# Patient Record
Sex: MALE | Race: OTHER | HISPANIC OR LATINO | Employment: STUDENT | URBAN - METROPOLITAN AREA
[De-identification: names, ages, dates, MRNs, and addresses within clinical notes are randomized per-mention and may not be internally consistent; named-entity substitution may affect disease eponyms.]

---

## 2024-09-30 ENCOUNTER — ATHLETIC TRAINING SESSION (OUTPATIENT)
Dept: SPORTS MEDICINE | Facility: CLINIC | Age: 24
End: 2024-09-30
Payer: COMMERCIAL

## 2024-09-30 DIAGNOSIS — S09.90XA INJURY OF HEAD, INITIAL ENCOUNTER: Primary | ICD-10-CM

## 2024-09-30 NOTE — PROGRESS NOTES
"Reason for Encounter New Injury    Subjective:       Chief Complaint: Derick Zee is a 23 y.o. male student at [employer] who had concerns including Head Injury. During the Scrimmage on Friday, 09/27/2024, he was hit in the back of the head by the batters back swing. Ath was alert, had no difficulties remembering what happened, felt fine, and was able to finish the game.      Sport played: baseball      Level: college      Position:catcher      Head Injury  This is a new problem. The current episode started in the past 7 days. The problem has been unchanged. Pertinent negatives include no nausea, numbness or vomiting.       Review of Systems   Eyes:  Negative for blurred vision, double vision, vision loss in left eye, vision loss in right eye, visual disturbance and visual halos.   Gastrointestinal:  Negative for nausea and vomiting.   Neurological:  Negative for dizziness, loss of balance and numbness.                 Objective:       General: Derick is well-developed, well-nourished, appears stated age, in no acute distress, alert and oriented to time, place and person.     General    Neurological: He is alert. No cranial nerve deficit. Coordination normal.   Pt was alert, able to answer questions correctly in a timely manner, able to recall events that happened prior to being hit, had no issues with eye tracking or vision             Concussion Symptoms Score Sheet     Date of Concussion: 09/27/2024        Symptoms Score Sheet for Each Day  Please rate the severity of your symptoms on a scale of 0-6.  None=0, Mild=1-2, Moderate=3-4, Severe=5-6    Date of Evaluation 9/27/2024 9/28/2024   Symptom Evaluation  0-6 0-6   Headache 3 2   "Pressure in head" 1 2   Neck pain  0 0   Nausea or vomiting 0 0   Dizziness 0 0   Blurred vision 0 0   Balance problems 0 0   Sensitivity to light 2 1   Sensitivity to noise  0 0   Feeling slowed down 0 0   Feeling like "in a fog" 0 0   "Don't feel right" 0 0   Difficulty concentrating " 2 0   Difficulty remembering  0 0   Fatigue or low energy 0 0   Confusion  0 0   Drowsiness 0 0   More emotional 0 0   Irritability  1 1   Sadness 0 0   Nervous or Anxious 0 0   Trouble falling asleep 0 2         Total # of symptoms 5/22 5/22   Symptom severity score 9/132 8/132     - Symptoms did not get worse with physical activity on 9/28/2024.        Assessment:     Status: O - Out    Date Seen:  09/27/2024- 09/28/2024    Date of Injury:  09/27/2024    Date Out:  09/30/2024    Date Cleared:  N/A        Treatment/Rehab/Maintenance:          Plan:       1. Have athlete see Dr. Mireles for a concussion evaluation since his symptoms have not improved much since the onset, 09/27/2024.  2. Physician Referral: yes  3. ED Referral:no  4. Parent/Guardian Notified: No  5. All questions were answered, ath. will contact me for questions or concerns in  the interim.  6.         Eligible to use School Insurance: Yes

## 2024-10-01 ENCOUNTER — OFFICE VISIT (OUTPATIENT)
Dept: SPORTS MEDICINE | Facility: CLINIC | Age: 24
End: 2024-10-01
Payer: COMMERCIAL

## 2024-10-01 VITALS
HEART RATE: 48 BPM | WEIGHT: 195 LBS | DIASTOLIC BLOOD PRESSURE: 77 MMHG | SYSTOLIC BLOOD PRESSURE: 127 MMHG | HEIGHT: 71 IN | BODY MASS INDEX: 27.3 KG/M2

## 2024-10-01 DIAGNOSIS — M99.00 CRANIAL SOMATIC DYSFUNCTION: ICD-10-CM

## 2024-10-01 DIAGNOSIS — M99.03 SOMATIC DYSFUNCTION OF LUMBAR REGION: ICD-10-CM

## 2024-10-01 DIAGNOSIS — M99.08 SOMATIC DYSFUNCTION OF RIB CAGE REGION: ICD-10-CM

## 2024-10-01 DIAGNOSIS — M99.01 SOMATIC DYSFUNCTION OF CERVICAL REGION: ICD-10-CM

## 2024-10-01 DIAGNOSIS — M99.02 SOMATIC DYSFUNCTION OF THORACIC REGION: ICD-10-CM

## 2024-10-01 DIAGNOSIS — S06.0X0A CONCUSSION WITHOUT LOSS OF CONSCIOUSNESS, INITIAL ENCOUNTER: Primary | ICD-10-CM

## 2024-10-01 PROCEDURE — 3078F DIAST BP <80 MM HG: CPT | Mod: CPTII,S$GLB,, | Performed by: ORTHOPAEDIC SURGERY

## 2024-10-01 PROCEDURE — 3074F SYST BP LT 130 MM HG: CPT | Mod: CPTII,S$GLB,, | Performed by: ORTHOPAEDIC SURGERY

## 2024-10-01 PROCEDURE — 1160F RVW MEDS BY RX/DR IN RCRD: CPT | Mod: CPTII,S$GLB,, | Performed by: ORTHOPAEDIC SURGERY

## 2024-10-01 PROCEDURE — 3008F BODY MASS INDEX DOCD: CPT | Mod: CPTII,S$GLB,, | Performed by: ORTHOPAEDIC SURGERY

## 2024-10-01 PROCEDURE — 99204 OFFICE O/P NEW MOD 45 MIN: CPT | Mod: 25,S$GLB,, | Performed by: ORTHOPAEDIC SURGERY

## 2024-10-01 PROCEDURE — 1159F MED LIST DOCD IN RCRD: CPT | Mod: CPTII,S$GLB,, | Performed by: ORTHOPAEDIC SURGERY

## 2024-10-01 PROCEDURE — 99999 PR PBB SHADOW E&M-EST. PATIENT-LVL III: CPT | Mod: PBBFAC,,, | Performed by: ORTHOPAEDIC SURGERY

## 2024-10-01 PROCEDURE — 98927 OSTEOPATH MANJ 5-6 REGIONS: CPT | Mod: S$GLB,,, | Performed by: ORTHOPAEDIC SURGERY

## 2024-10-01 NOTE — PROGRESS NOTES
"Subjective:     Derick, a 23 y.o. male is here today for evaluation of a closed head injury. DOI: 09/27/2024. No LOC from event. He states he was participating in a scrimmage when he was hit in the back of the head by the batter's back swing. He immediately appreciated blurry vision and states he fell to the ground. He states he completed playing the remainder of the game. He continues to appreciate pressure affecting the front of his head and behind his eyes.     School / grade: [employer] / senior   Sport: Baseball  Position: Catcher  Dominant hand: Right   How many concussions have you have in the past? 0  When was your most recent concussion & how long was recovery? NA  Have you ever been hospitalized or had medical imaging done for a head injury? No  Have you ever been diagnosed with headaches or migraines? No  Do you have a learning disability / dyslexia? No  Do you have ADD/ADHD? No  Have you been diagnosed with depression, anxiety or other psychiatric disorder? No  Do you have a history of motion sickness? No  Do you have a history of syncope? No  Do you have a history of epilepsy/seizures? No  Do you wear glasses or contacts? No    If so, when was your last vision exam? NA  Have you taken a baseline ImPACT examination? Yes      Symptom Evaluation  0-6   Headache 3   "Pressure in head" 3   Neck pain  0   Nausea or vomiting 0   Dizziness 0   Blurred vision 0   Balance problems 0   Sensitivity to light 2   Sensitivity to noise  3   Feeling slowed down 0   Feeling like "in a fog" 0   "Don't feel right" 1   Difficulty concentrating 0   Difficulty remembering  0   Fatigue or low energy 1   Confusion  0   Drowsiness 0   More emotional 0   Irritability  1   Sadness 0   Nervous or Anxious 0   Trouble falling asleep 3         Total # of symptoms 8/22   Symptom severity score 17/132     HPI template based on:  1) Consensus statement on concussion in sport--the 5th international conference on concussion in sport held in " "[place], October 2016  2) Sport concussion assessment tool--5th edition    PAST MEDICAL HISTORY:  History reviewed. No pertinent past medical history.    SURGICAL HISTORY:  History reviewed. No pertinent surgical history.    FAMILY HISTORY:  No family history on file.    SOCIAL HISTORY:   has no history on file for tobacco use, alcohol use, and drug use.    MEDICATIONS:  No current outpatient medications on file.    ALLERGIES:  Review of patient's allergies indicates:  No Known Allergies      Objective:     PHYSICAL EXAMINATION:  /77   Pulse (!) 48   Ht 5' 11" (1.803 m)   Wt 88.5 kg (195 lb)   BMI 27.20 kg/m²   Vitals signs and nursing note have been reviewed.  General: In no acute distress, well developed, well nourished, no diaphoresis  Eyes: no eye redness or discharge  HENT: normocephalic and atraumatic, neck supple, trachea midline, no nasal discharge, no external ear redness or discharge. No evidence of ronald orbital raccoon sign to suggest orbital fracture or mastoid process austin sign to suggest basilar skull fracture on observation. No significant pain with cranial compression to suggest skull fracture upon palpation.   Cardiovascular: 2+ and symmetric radial and DP pulses bilaterally, no LE edema  Lungs: respirations non-labored, no conversational dyspnea   Abd: non-distended, no rigidity  Skin: No rashes, warm and dry  Psychiatric: cooperative, pleasant, mood and affect appropriate for age    NEURO EXAM:  Sensation to light touch intact for UE and LE dermatomes  CN II-XII intact suggesting no intracranial hemorrhage  Upper limb and lower limb coordination: normal  Finger-to-nose testing: appropriate    Strength Testing: ('*' = with pain)           Upper Extremity  Deltoid                                    5/5 B/L  Biceps               5/5 B/L  Triceps               5/5 B/L  Wrist Flexion   5/5 B/L  Wrist Extension  5/5 B/L      5/5 B/L  Finger ABduction  5/5 B/L  Finger ABduction  5/5 " B/L  EPL (Ext. pollicis longus) 5/5 B/L  Pinch Mechanism  5/5 B/L    Lower Extremity  Hip Flexion   5/5 B/L  Hamstrings   5/5 B/L  Quadraceps              5/5 B/L  Ankle Dorsiflexion  5/5 B/L  Ankle Plantarflexion  5/5 B/L  Ankle Inversion  5/5 B/L  Ankle Eversion  5/5 B/L  EHL (Ext. hollicis longus) 5/5 B/L       Special Tests:                          Spurling's  Negative B/L  Seated SLR  Negative B/L    Modified Balance Error Scoring System (mBESS) testing:    Non-dominant foot: left   Testing surface: Hard floor, shoes on   Number of Errors   Double Leg Stance 0     Single Leg Stance 4     Tandem Stance 0     Total Errors 4       Vestibular/Ocular-Motor Screening (VOMS) Testing:   Headache Dizziness Nausea Fogginess Comments   Symptom severity prior to test 5   0   0   0   No data recorded   VOM Test        Smooth Pursuits 5   0   0   3   blurry vision     Saccades - Horizontal 5   0   0   3   No data recorded   Saccades - Vertical 5   3   0   0   No data recorded   Congergence 5   3   0   4   Measurements (cm):    8 cm, 9 cm, 8 cm - left lateral eye drift     VOR - Horizontal 5   0   0   1   No data recorded   VOR - Vertical 5   0   0   2   No data recorded   Visual Motion Sensitivity Test 5   3   0   4   No data recorded       MUSCULOSKELETAL EXAM:    Posture:  Upright  Neck examination:  Range of motion: Normal  Tenderness: None    TART (Tissue texture abnormality, Asymmetry,  Restriction of motion and/or Tenderness) changes:    Head:     - Cranial Rhythmic Impulse (CRI): restricted with Decreased amplitude left posterior  - Strain Patterns: Left lateral strain  Occipitoatlantal (OA) Joint: ES-right, R-left     Cervical Spine   C1 Rotated LEFT   C2 Neutral   C3 Neutral   C4 ERS LEFT   C5 ERS LEFT   C6 Neutral   C7 FRS RIGHT      Thoracic Spine   T1 FRS RIGHT   T2 FRS RIGHT   T3 Neutral   T4 Neutral   T5 NS-left,R-right   T6 NS-left,R-right   T7 NS-left,R-right   T8 Neutral   T9 Neutral   T10 Neutral   T11  Neutral   T12 Neutral     Rib cage:   Superior rib 1 on the left  Posterior rib 4-5 left     Lumbar Spine   L1 Neutral   L2 Neutral   L3 Neutral   L4 FRS LEFT   L5 FRS LEFT     Pelvis:    Sacrum:      Key   F= Flexed   E = Extended   R = Rotated   N = Neutral   S = Sidebent   TTA = tissue texture abnormality   L/R/B (last letter) = left/right/bilateral   HTP = fascial herniated trigger point   TB = fascial trigger band   CD = fascial continuum distortion       Assessment:     Encounter Diagnoses   Name Primary?    Concussion without loss of consciousness, initial encounter Yes    Somatic dysfunction of cervical region     Cranial somatic dysfunction     Somatic dysfunction of thoracic region     Somatic dysfunction of rib cage region     Somatic dysfunction of lumbar region      First time concussion, w/out loss of consciousness  No evidence of myelopathy / spinal cord pathology  No evidence of focal neurologic deficit  No evidence of skull fracture    Plan:     1) Reassuring evaluation, though symptoms remain.    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 He states he immediately feel to the ground and had blurry vision. He continues to appreciate pressure behind his eyes and denies improvement in his symptoms since his initial injury.     - Concussion information folder with handouts provided.  Information regarding return to learn, return to play, daily symptoms/severity scores, accommodations provided and discussed as necessary.       Yes (+) or No (-) Comments   Neuropsychological testing     Administered, reviewed, and shared with the patient (and family, if present) at this visit. - Deferred due to symptom exacerbation with VOMs   Mental activity     School attendance allowed? +    w/ concussion accommodations? -    Social activity     In person, telephone, and text interactions limited? +     Physical activity (e.g. sports, work)     sports participation prohibited? + Not yet cleared to begin RTP protocol   Clinic contact w/  today to discuss plan? + School: [employer]  : Preston Castillo       2) Education:   - Education provided on the diagnosis of concussion. We reviewed the signs and symptoms of concussion, current knowledge on concussions, and the importance of brain and physical rest until symptom resolution. Once symptoms are improving/improved, a progressive return to activity under daily guidance is initiated. We discussed second impact syndrome, that all concussions are significant, and that we cannot predict when one will result in residual symptoms or the development of sequelae later in life. We also reviewed that concussions also often are a whiplash event that can have mechanical head, neck, and upper back symptoms that may improve/resolve with osteopathic manipulative treatment. I advised that concussion is a clinical diagnosis and we take into account many factors including mechanism of injury, symptoms and symptom severity, and physical examination focusing on the neurologic and visual symptoms.    3) OMT 5-6 regions. Oral consent obtained by patient and parent.  Reviewed benefits and potential side effects. Parent present in the room during entire evaluation and treatment.  - OMT indicated today due to signs and symptoms as well as local and remote somatic dysfunction findings and their related neurokinetic, lymphatic, fascial and/or arteriovenous body connections.   - OMT techniques used: Soft Tissue, Myofascial Release, Muscle Energy, High Velocity Low Amplitude, and Cranial    - Treatment was tolerated well. Improvement noted in segmental mobility post-treatment in dysfunctional regions. There were no adverse events and no complications immediately following treatment.     4) Follow up in 1 week or sooner for re evaluation should patient's symptoms  COMPLETELY resolve  -  upon successful completion of RTP protocol per SCAT5, pt/family/AT will contact the clinic, and the clinic will document successful completion  - if any Step of RTP protocol per SCAT5 is failed, pt/family/AT will immediately alert the clinic for further evaluation    - Should symptoms acutely worsen, or should new symptoms arise, the patient should call the clinic, but if unavailable immediately present to the Emergency Department for further evaluation.    5) Patient and parent/family/ATC agreeable to today's plan and all questions were answered      This note is dictated using the M*Modal Fluency Direct word recognition program. There are word recognition mistakes that are occasionally missed on review.

## 2024-10-07 NOTE — PROGRESS NOTES
"Subjective:     Derick is here today for follow up evaluation of closed head injury sustained 09/27/2024. He states he is now feeling 65-70% improved but continues to note worsening pressure in his head with exertion. He denies new injury or trauma since his last visit. He states he has been working with his  and has progressed through three steps of the RTP protocol and notes worsening symptoms during and after exercise.     Recall from visit on 10/01/2024  Derick, a 23 y.o. male is here today for evaluation of a closed head injury. DOI: 09/27/2024. No LOC from event. He states he was participating in a scrimmage when he was hit in the back of the head by the batter's back swing. He immediately appreciated blurry vision and states he fell to the ground. He states he completed playing the remainder of the game. He continues to appreciate pressure affecting the front of his head and behind his eyes.     School / grade: [employer] / senior   Sport: Baseball  Position: Catcher  Dominant hand: Right   How many concussions have you have in the past? 0  When was your most recent concussion & how long was recovery? NA  Have you ever been hospitalized or had medical imaging done for a head injury? No  Have you ever been diagnosed with headaches or migraines? No  Do you have a learning disability / dyslexia? No  Do you have ADD/ADHD? No  Have you been diagnosed with depression, anxiety or other psychiatric disorder? No  Do you have a history of motion sickness? No  Do you have a history of syncope? No  Do you have a history of epilepsy/seizures? No  Do you wear glasses or contacts? No    If so, when was your last vision exam? NA  Have you taken a baseline ImPACT examination? Yes       10/1/24 10/7/24   Symptom Evaluation  0-6 0-6   Headache 3 0   "Pressure in head" 3 2   Neck pain  0 0   Nausea or vomiting 0 0   Dizziness 0 0   Blurred vision 0 0   Balance problems 0 0   Sensitivity to light 2 1   Sensitivity to " "noise  3 1   Feeling slowed down 0 0   Feeling like "in a fog" 0 0   "Don't feel right" 1 2   Difficulty concentrating 0 0   Difficulty remembering  0 0   Fatigue or low energy 1 2   Confusion  0 0   Drowsiness 0 0   More emotional 0 0   Irritability  1 1   Sadness 0 0   Nervous or Anxious 0 0   Trouble falling asleep 3 2          Total # of symptoms 8/22 7/22   Symptom severity score 17/132 11/132     HPI template based on:  1) Consensus statement on concussion in sport--the 5th international conference on concussion in sport held in [place], October 2016  2) Sport concussion assessment tool--5th edition    PAST MEDICAL HISTORY:  History reviewed. No pertinent past medical history.    SURGICAL HISTORY:  History reviewed. No pertinent surgical history.    FAMILY HISTORY:  No family history on file.    SOCIAL HISTORY:   has no history on file for tobacco use, alcohol use, and drug use.    MEDICATIONS:  No current outpatient medications on file.    ALLERGIES:  Review of patient's allergies indicates:  No Known Allergies      Objective:     PHYSICAL EXAMINATION:  /76   Pulse 62   Ht 5' 11" (1.803 m)   Wt 88.5 kg (195 lb 1.7 oz)   BMI 27.21 kg/m²   Vitals signs and nursing note have been reviewed.  General: In no acute distress, well developed, well nourished, no diaphoresis  Eyes: no eye redness or discharge  HENT: normocephalic and atraumatic, neck supple, trachea midline, no nasal discharge, no external ear redness or discharge. No evidence of ronald orbital raccoon sign to suggest orbital fracture or mastoid process austin sign to suggest basilar skull fracture on observation. No significant pain with cranial compression to suggest skull fracture upon palpation.   Cardiovascular: 2+ and symmetric radial and DP pulses bilaterally, no LE edema  Lungs: respirations non-labored, no conversational dyspnea   Abd: non-distended, no rigidity  Skin: No rashes, warm and dry  Psychiatric: cooperative, pleasant, mood " and affect appropriate for age    NEURO EXAM:  Sensation to light touch intact for UE and LE dermatomes  CN II-XII intact suggesting no intracranial hemorrhage  Upper limb and lower limb coordination: normal  Finger-to-nose testing: appropriate    Strength Testing: ('*' = with pain)           Upper Extremity  Deltoid                                    5/5 B/L  Biceps               5/5 B/L  Triceps               5/5 B/L  Wrist Flexion   5/5 B/L  Wrist Extension  5/5 B/L      5/5 B/L  Finger ABduction  5/5 B/L  Finger ABduction  5/5 B/L  EPL (Ext. pollicis longus) 5/5 B/L  Pinch Mechanism  5/5 B/L    Lower Extremity  Hip Flexion   5/5 B/L  Hamstrings   5/5 B/L  Quadraceps              5/5 B/L  Ankle Dorsiflexion  5/5 B/L  Ankle Plantarflexion  5/5 B/L  Ankle Inversion  5/5 B/L  Ankle Eversion  5/5 B/L  EHL (Ext. hollicis longus) 5/5 B/L       Special Tests:                          Spurling's  Negative B/L  Seated SLR  Negative B/L    Modified Balance Error Scoring System (mBESS) testing:    Non-dominant foot: left   Testing surface: Hard floor, shoes on   Number of Errors   Double Leg Stance 0     Single Leg Stance 0     Tandem Stance 0     Total Errors 0       Vestibular/Ocular-Motor Screening (VOMS) Testing:   Headache Dizziness Nausea Fogginess Comments   Symptom severity prior to test 0   2   0   0   No data recorded   VOM Test        Smooth Pursuits 0   2   0   0   eye pressure     Saccades - Horizontal 1   1   0   3   No data recorded   Saccades - Vertical 1   1   0   3   No data recorded   Congergence 2   1   0   2   Measurements (cm):    >14 cm, > 14 cm, > 14 cm     VOR - Horizontal 1   1   0   3   No data recorded   VOR - Vertical 2   1   0   3   No data recorded   Visual Motion Sensitivity Test 2   1   0   3   No data recorded     MUSCULOSKELETAL EXAM:    Posture:  Upright  Neck examination:  Range of motion: Normal  Tenderness: None    TART (Tissue texture abnormality, Asymmetry,  Restriction of motion  and/or Tenderness) changes:    Head:     - Cranial Rhythmic Impulse (CRI): restricted with normal amplitude   - Strain Patterns: Right Torsion    Occipitoatlantal (OA) Joint: ES-right, R-left     Cervical Spine   C1 Neutral   C2 FRS LEFT   C3 Neutral   C4 Neutral   C5 Neutral   C6 ERS RIGHT   C7 FRS RIGHT      Thoracic Spine   T1 Neutral   T2 Neutral   T3 ERS RIGHT   T4 Neutral   T5 NS-left,R-right   T6 NS-left,R-right   T7 NS-left,R-right   T8 NS-left,R-right   T9 Neutral   T10 Neutral   T11 Neutral   T12 Neutral     Rib cage:   Superior rib 1  left  Posterior rib 5 left     Lumbar Spine   L1 Neutral   L2 ERS RIGHT   L3 Neutral   L4 FRS LEFT   L5 FRS LEFT     Pelvis:  MFR right hemipelvis    Sacrum:  Right on right torsion    Key   F= Flexed   E = Extended   R = Rotated   N = Neutral   S = Sidebent   TTA = tissue texture abnormality   L/R/B (last letter) = left/right/bilateral   HTP = fascial herniated trigger point   TB = fascial trigger band   CD = fascial continuum distortion       Assessment:     Encounter Diagnoses   Name Primary?    Concussion without loss of consciousness, subsequent encounter Yes    Somatic dysfunction of cervical region     Somatic dysfunction of rib cage region     Somatic dysfunction of upper extremity     Somatic dysfunction of thoracic region     Cranial somatic dysfunction     Somatic dysfunction of pelvis region     Somatic dysfunction of sacral region     Somatic dysfunction of lumbar region        First time concussion, w/out loss of consciousness  No evidence of myelopathy / spinal cord pathology  No evidence of focal neurologic deficit  No evidence of skull fracture    Plan:     1) Reassuring evaluation, though symptoms remain.    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 He states he immediately feel to the ground and had blurry vision. He continues to  appreciate pressure behind his eyes and denies improvement in his symptoms since his initial injury.     - He is now feeling 65-70% improved since his last visit and has been working through his RTP with his , but endorses exacerbation of symptoms with exercise.     - Based on his description/body language of pain and somatic dysfunction identified on exam, I discussed osteopathic manipulation as a treatment option today.  He consents to evaluation and treatment.  See below.    - HEP for vestibular retraining prescribed today. Handouts provided, explained, and exercises were demonstrated as needed. Encouraged to do daily. [id number] HOME EXERCISE PROGRAM (HEP):  The patient was taught a homegoing physical therapy regimen as described above by provider with assistance of sports medicine assistant. The patient demonstrated understanding of the exercises and proper technique of their execution. This interaction took 15 minutes.     - Vestibular therapy referral has been placed.    - Continue with daily symptoms/severity scores.       Yes (+) or No (-) Comments   Neuropsychological testing     Administered, reviewed, and shared with the patient (and family, if present) at this visit. -    Mental activity     School attendance allowed? +    w/ concussion accommodations? -    Social activity     In person, telephone, and text interactions limited? +    Physical activity (e.g. sports, work)     sports participation prohibited? - Continue with RTP protocol   Clinic contact w/  today to discuss plan? + School: [employer]  : Preston Castillo       2) Education:   - Education provided on the diagnosis of concussion. We reviewed the signs and symptoms of concussion, current knowledge on concussions, and the importance of brain and physical rest until symptom resolution. Once symptoms are improving/improved, a progressive return to activity under daily guidance is initiated. We discussed second  impact syndrome, that all concussions are significant, and that we cannot predict when one will result in residual symptoms or the development of sequelae later in life. We also reviewed that concussions also often are a whiplash event that can have mechanical head, neck, and upper back symptoms that may improve/resolve with osteopathic manipulative treatment. I advised that concussion is a clinical diagnosis and we take into account many factors including mechanism of injury, symptoms and symptom severity, and physical examination focusing on the neurologic and visual symptoms.    3) OMT 7-8 regions. Oral consent obtained by patient and parent.  Reviewed benefits and potential side effects. Parent present in the room during entire evaluation and treatment.  - OMT indicated today due to signs and symptoms as well as local and remote somatic dysfunction findings and their related neurokinetic, lymphatic, fascial and/or arteriovenous body connections.   - OMT techniques used: Soft Tissue, Myofascial Release, Muscle Energy, High Velocity Low Amplitude, and Cranial    - Treatment was tolerated well. Improvement noted in segmental mobility post-treatment in dysfunctional regions. There were no adverse events and no complications immediately following treatment.     4) Follow up in 1 week or sooner for re evaluation should patient's symptoms COMPLETELY resolve  -  upon successful completion of RTP protocol per SCAT5, pt/family/AT will contact the clinic, and the clinic will document successful completion  - if any Step of RTP protocol per SCAT5 is failed, pt/family/AT will immediately alert the clinic for further evaluation    - Should symptoms acutely worsen, or should new symptoms arise, the patient should call the clinic, but if unavailable immediately present to the Emergency Department for further evaluation.    5) Patient and parent/family/ATC agreeable to today's plan and all questions were answered      This note  is dictated using the [employer] Fluency Direct word recognition program. There are word recognition mistakes that are occasionally missed on review.

## 2024-10-08 ENCOUNTER — ATHLETIC TRAINING SESSION (OUTPATIENT)
Dept: SPORTS MEDICINE | Facility: CLINIC | Age: 24
End: 2024-10-08
Payer: COMMERCIAL

## 2024-10-08 ENCOUNTER — OFFICE VISIT (OUTPATIENT)
Dept: SPORTS MEDICINE | Facility: CLINIC | Age: 24
End: 2024-10-08
Payer: COMMERCIAL

## 2024-10-08 VITALS
HEIGHT: 71 IN | BODY MASS INDEX: 27.32 KG/M2 | DIASTOLIC BLOOD PRESSURE: 76 MMHG | WEIGHT: 195.13 LBS | SYSTOLIC BLOOD PRESSURE: 123 MMHG | HEART RATE: 62 BPM

## 2024-10-08 DIAGNOSIS — M99.01 SOMATIC DYSFUNCTION OF CERVICAL REGION: ICD-10-CM

## 2024-10-08 DIAGNOSIS — M99.03 SOMATIC DYSFUNCTION OF LUMBAR REGION: ICD-10-CM

## 2024-10-08 DIAGNOSIS — M99.05 SOMATIC DYSFUNCTION OF PELVIS REGION: ICD-10-CM

## 2024-10-08 DIAGNOSIS — S06.0X0D CONCUSSION WITHOUT LOSS OF CONSCIOUSNESS, SUBSEQUENT ENCOUNTER: Primary | ICD-10-CM

## 2024-10-08 DIAGNOSIS — M99.02 SOMATIC DYSFUNCTION OF THORACIC REGION: ICD-10-CM

## 2024-10-08 DIAGNOSIS — M99.00 CRANIAL SOMATIC DYSFUNCTION: ICD-10-CM

## 2024-10-08 DIAGNOSIS — H51.11 CONVERGENCE INSUFFICIENCY: ICD-10-CM

## 2024-10-08 DIAGNOSIS — M99.07 SOMATIC DYSFUNCTION OF UPPER EXTREMITY: ICD-10-CM

## 2024-10-08 DIAGNOSIS — M99.04 SOMATIC DYSFUNCTION OF SACRAL REGION: ICD-10-CM

## 2024-10-08 DIAGNOSIS — M99.08 SOMATIC DYSFUNCTION OF RIB CAGE REGION: ICD-10-CM

## 2024-10-08 PROCEDURE — 98928 OSTEOPATH MANJ 7-8 REGIONS: CPT | Mod: S$GLB,,, | Performed by: ORTHOPAEDIC SURGERY

## 2024-10-08 PROCEDURE — 3074F SYST BP LT 130 MM HG: CPT | Mod: CPTII,S$GLB,, | Performed by: ORTHOPAEDIC SURGERY

## 2024-10-08 PROCEDURE — 99214 OFFICE O/P EST MOD 30 MIN: CPT | Mod: 25,S$GLB,, | Performed by: ORTHOPAEDIC SURGERY

## 2024-10-08 PROCEDURE — 99999 PR PBB SHADOW E&M-EST. PATIENT-LVL III: CPT | Mod: PBBFAC,,, | Performed by: ORTHOPAEDIC SURGERY

## 2024-10-08 PROCEDURE — 1159F MED LIST DOCD IN RCRD: CPT | Mod: CPTII,S$GLB,, | Performed by: ORTHOPAEDIC SURGERY

## 2024-10-08 PROCEDURE — 3078F DIAST BP <80 MM HG: CPT | Mod: CPTII,S$GLB,, | Performed by: ORTHOPAEDIC SURGERY

## 2024-10-08 PROCEDURE — 3008F BODY MASS INDEX DOCD: CPT | Mod: CPTII,S$GLB,, | Performed by: ORTHOPAEDIC SURGERY

## 2024-10-08 PROCEDURE — 1160F RVW MEDS BY RX/DR IN RCRD: CPT | Mod: CPTII,S$GLB,, | Performed by: ORTHOPAEDIC SURGERY

## 2024-10-08 PROCEDURE — 97110 THERAPEUTIC EXERCISES: CPT | Mod: S$GLB,,, | Performed by: ORTHOPAEDIC SURGERY

## 2024-10-08 NOTE — PROGRESS NOTES
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      Sport played: baseball      Level: college      Position:catcher              Objective:       General: Derick is well-developed, well-nourished, appears stated age, in no acute distress, alert and oriented to time, place and person.     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 Felt about the same as yesterday when catching the bullpens  Date 10/11/24        Symptom Evaluation  0-6   Headache 0   "Pressure in head" 2   Neck pain  0   Nausea or vomiting 0   Dizziness 0   Blurred vision 0   Balance problems 0   Sensitivity to light 1   Sensitivity to noise  0   Feeling slowed down 0   Feeling like "in a fog" 0   "Don't feel right" 0   Difficulty concentrating 0   Difficulty remembering  0   Fatigue or low energy 1   Confusion  0   Drowsiness 0   More emotional 0   Irritability  0   Sadness 0   Nervous or Anxious 0   Trouble falling asleep 1   Total # of symptoms 4/22   Symptom severity score 5/132     10/10/2024  Day 4 of RTP: 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 Ath was able to take some breaks between each pitcher which he said helped prevent the pressure behind his eyes from increasing.   Date 10/10/24        Symptom Evaluation  0-6   Headache 0   "Pressure in head" 2   Neck pain  0   Nausea or vomiting 0   Dizziness 0   Blurred vision 0   Balance problems 0   Sensitivity to light 1   Sensitivity to noise  0   Feeling slowed down 0   Feeling like "in a fog" 0 " "  "Don't feel right" 0   Difficulty concentrating 0   Difficulty remembering  0   Fatigue or low energy 1   Confusion  0   Drowsiness 0   More emotional 0   Irritability  1   Sadness 0   Nervous or Anxious 0   Trouble falling asleep 1   Total # of symptoms 5/22   Symptom severity score 6/132     10/09/2024  Date 10/9/24        Symptom Evaluation  0-6   Headache 0   "Pressure in head" 2   Neck pain  0   Nausea or vomiting 0   Dizziness 0   Blurred vision 0   Balance problems 0   Sensitivity to light 1   Sensitivity to noise  0   Feeling slowed down 0   Feeling like "in a fog" 0   "Don't feel right" 0   Difficulty concentrating 0   Difficulty remembering  0   Fatigue or low energy 1   Confusion  0   Drowsiness 0   More emotional 0   Irritability  0   Sadness 0   Nervous or Anxious 0   Trouble falling asleep 2   Total # of symptoms 4/22   Symptom severity score 6/132       10/08/2024  Date 10/8/24        Symptom Evaluation  0-6   Headache 0   "Pressure in head" 2   Neck pain  0   Nausea or vomiting 0   Dizziness 0   Blurred vision 0   Balance problems 0   Sensitivity to light 1   Sensitivity to noise  1   Feeling slowed down 0   Feeling like "in a fog" 0   "Don't feel right" 0   Difficulty concentrating 0   Difficulty remembering  0   Fatigue or low energy 1   Confusion  0   Drowsiness 0   More emotional 0   Irritability  0   Sadness 0   Nervous or Anxious 0   Trouble falling asleep 2   Total # of symptoms 5/22   Symptom severity score 7/132       10/07/2024  Day 3 of RTP: Ath participated in individual catching drills at practice which consisted of: receiving the baseball from his catchers stance, moving left and right mimicking blocking a ball in the dirt, popping up from catchers stance to field a bunt. Lasted 20min in total.  Date 10/7/24 10/7/24     Before After   Symptom Evaluation  0-6 0-6   Headache 0 0   "Pressure in head" 2 3   Neck pain  0 0   Nausea or vomiting 0 1   Dizziness 0 1   Blurred vision 0 0 " "  Balance problems 0 0   Sensitivity to light 1 1   Sensitivity to noise  1 2   Feeling slowed down 0 0   Feeling like "in a fog" 0 0   "Don't feel right" 0 1   Difficulty concentrating 0 0   Difficulty remembering  0 0   Fatigue or low energy 1 2   Confusion  0 0   Drowsiness 0 0   More emotional 0 0   Irritability  1 1   Sadness 0 0   Nervous or Anxious 0 0   Trouble falling asleep 2 2   Total # of symptoms 6/22 10/22   Symptom severity score 8/132 11/132       10/06/2024  Date 10/6/24     Before   Symptom Evaluation  0-6   Headache 0   "Pressure in head" 1   Neck pain  0   Nausea or vomiting 0   Dizziness 0   Blurred vision 0   Balance problems 0   Sensitivity to light 1   Sensitivity to noise  1   Feeling slowed down 0   Feeling like "in a fog" 0   "Don't feel right" 0   Difficulty concentrating 0   Difficulty remembering  0   Fatigue or low energy 1   Confusion  0   Drowsiness 0   More emotional 0   Irritability  1   Sadness 0   Nervous or Anxious 0   Trouble falling asleep 2   Total # of symptoms 6/22   Symptom severity score 7/132         Plan:       1.   2. Physician Referral: yes  3. ED Referral:no  4. Parent/Guardian Notified: No  5. All questions were answered, ath. will contact me for questions or concerns in  the interim.  6.         Eligible to use School Insurance: Yes                  "

## 2024-10-08 NOTE — PROGRESS NOTES
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      Sport played: baseball      Level: college      Position:catcher            Objective:       General: Derick is well-developed, well-nourished, appears stated age, in no acute distress, alert and oriented to time, place and person.     AT Session          Assessment:     Status: O - Out    Date Seen:  09/30/2024- 10/5/2024    Date of Injury:  09/27/2024    Date Out:  09/30/2024    Date Cleared:  N/A        Treatment/Rehab/Maintenance:     10/05/2024  - Day 2 of RTP: Ath did an interval sprint to jog from foul pole to foul pole at the baseball field following the warning track. He sprinted from left field foul pole to left center, jogged from left center to center field, sprinted from center field to right center, jogged from right center to right field, then sprinted from right field to the right foul pole. He took a 2min break before repeating for a total of 20min. The sprints were at 90% exertion and for about 90 feet  Here is his Symptom Screen Before and After he completed the workout:  Date 10/5/24 10/5/24    Before After   Symptom Evaluation  0-6 0-6   Headache 0 1   "Pressure in head" 3 3   Neck pain  0 0   Nausea or vomiting 0 1   Dizziness 0 1   Blurred vision 0 0   Balance problems 0 0   Sensitivity to light 2 2   Sensitivity to noise  2 2   Feeling slowed down 0 0   Feeling like "in a fog" 0 0   "Don't feel right" 0 1   Difficulty concentrating 0 0   Difficulty remembering  0 0   Fatigue or low energy 2 3   Confusion  0 0   Drowsiness 0 0   More emotional 0 0   Irritability  2 3   Sadness 0 0   Nervous or Anxious 0 0   Trouble falling asleep 2 2   Total # of symptoms 6/22 10/22   Symptom severity score 13/132 19/132        10/4/2024  - Day 1 of RTP: Ath completed 20min stationary bike workout at about 50% of " "exertion  Here is his Symptom Screen Before and After he completed the workout:  Date 10/4/24 10/4/24    Before After   Symptom Evaluation  0-6 0-6   Headache 1 1   "Pressure in head" 2 2   Neck pain  0 0   Nausea or vomiting 0 0   Dizziness 0 0   Blurred vision 0 0   Balance problems 0 0   Sensitivity to light 1 2   Sensitivity to noise  2 2   Feeling slowed down 0 0   Feeling like "in a fog" 0 0   "Don't feel right" 0 0   Difficulty concentrating 0 0   Difficulty remembering  0 0   Fatigue or low energy 1 1   Confusion  0 0   Drowsiness 0 0   More emotional 0 0   Irritability  2 2   Sadness 0 0   Nervous or Anxious 0 0   Trouble falling asleep 2 2   Total # of symptoms 7/22 7/22   Symptom severity score 11/132 12/132         10/03/2024    Date 10/3/24   Symptom Evaluation  0-6   Headache 2   "Pressure in head" 3   Neck pain  0   Nausea or vomiting 0   Dizziness 0   Blurred vision 0   Balance problems 0   Sensitivity to light 2   Sensitivity to noise  1   Feeling slowed down 0   Feeling like "in a fog" 0   "Don't feel right" 0   Difficulty concentrating 0   Difficulty remembering  0   Fatigue or low energy 2   Confusion  0   Drowsiness 0   More emotional 0   Irritability  2   Sadness 0   Nervous or Anxious 0   Trouble falling asleep 2   Total # of symptoms 7/22   Symptom severity score 14/132         10/02/2024  Date 10/2/24   Symptom Evaluation  0-6   Headache 3   "Pressure in head" 2   Neck pain  0   Nausea or vomiting 0   Dizziness 0   Blurred vision 0   Balance problems 0   Sensitivity to light 2   Sensitivity to noise  0   Feeling slowed down 0   Feeling like "in a fog" 0   "Don't feel right" 0   Difficulty concentrating 0   Difficulty remembering  0   Fatigue or low energy 2   Confusion  0   Drowsiness 0   More emotional 0   Irritability  1   Sadness 0   Nervous or Anxious 0   Trouble falling asleep 2   Total # of symptoms 6/22   Symptom severity score 12/132                 Plan:       1.   2. Physician " Referral: yes  3. ED Referral:no  4. Parent/Guardian Notified: No  5. All questions were answered, ath. will contact me for questions or concerns in  the interim.  6.         Eligible to use School Insurance: Yes

## 2024-10-11 ENCOUNTER — CLINICAL SUPPORT (OUTPATIENT)
Dept: REHABILITATION | Facility: HOSPITAL | Age: 24
End: 2024-10-11
Attending: ORTHOPAEDIC SURGERY
Payer: COMMERCIAL

## 2024-10-11 DIAGNOSIS — S06.0X0D CONCUSSION WITHOUT LOSS OF CONSCIOUSNESS, SUBSEQUENT ENCOUNTER: ICD-10-CM

## 2024-10-11 PROBLEM — S06.0X0A CONCUSSION WITH NO LOSS OF CONSCIOUSNESS: Status: ACTIVE | Noted: 2024-10-11

## 2024-10-11 PROCEDURE — 97161 PT EVAL LOW COMPLEX 20 MIN: CPT | Mod: PO

## 2024-10-11 PROCEDURE — 97112 NEUROMUSCULAR REEDUCATION: CPT | Mod: PO

## 2024-10-11 NOTE — PLAN OF CARE
[facility]  Physical Therapy Neurological Rehabilitation Initial Evaluation     Name: Derick Zee  Clinic Number: [id number]    Therapy Diagnosis:   Encounter Diagnosis   Name Primary?    Concussion without loss of consciousness, subsequent encounter      Physician: Jorge Mireles DO    Physician Orders: PT Eval and Treat Vestibular therapy  Medical Diagnosis from Referral: Concussion without loss of consciousness, subsequent encounter [S06.0X0D]   Evaluation Date: 10/11/2024  Authorization Period Expiration: 12/31/24  Plan of Care Expiration: 11/22/24  Progress Note Due: 11/8/24  Visit # / Visits authorized: 1/ 1  FOTO: 0/ 3    Precautions: Standard    Time In: 10:30  Time Out: 11:15  Total Billable Time: 45 minutes    Subjective      Date of onset: 9/27/24    History of current condition - Derick reports: He had a concussion on 9/27/24 and has gradually been feeling better since.  Started softball practice yesterday, and the ball is blurry.  A little dizziness after catching, lasted about half hour.  More sensitivity to light and sound.  Has been using blue light glasses, was doing this prior.   Back at light contact exercise, bull pens, has an AT at school he is working with. Notes this is his first concussion, no history of anxiety/depression, learning disability, headache or migraine.       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 He states he immediately feel to the ground and had blurry vision. He continues to appreciate pressure behind his eyes and denies improvement in his symptoms since his initial injury.     Imaging, no updated imaging    Prior Therapy: Outpatient Physical Therapy   Social History: Lives off campus  Home Environment: apt   Exercise Routine / History: [employer] baseball   Family Present at time of Eval: none   Occupation:  "school, base ball player  Prior Level of Function: Independent   Current Level of Function: Eye strain with phone, limited play at practice    First concussion,     Pain:  Current 0/10,   Denies any headaches  Pressure in frontal above eyes     Patient's goals: Return to     Medical History:   No past medical history on file.    Surgical History:   Derick Zee  has no past surgical history on file.    Medications:   Derick currently has no medications in their medication list.    Allergies:   Review of patient's allergies indicates:  No Known Allergies     Objective      - Follows commands: 100% of time   - Speech: within normal limits       Mental status: alert, oriented to person, place, and time, normal mood, behavior, speech, dress, motor activity, and thought processes  Appearance: Casually dressed  Behavior:  cooperative  Attention Span and Concentration:  Normal    Sensation: Light Touch: Intact      Visual/Auditory:   Oculomotor ROM: within normal limits   Eye Alignment: within normal limits   Visual Field: within normal limits       Vestibular/Ocular-Motor Screening (VOMS) for Concussion  Vestibular/Ocular Motor Test: Not Tested Headache   0-10 Dizziness  0-10 Nausea   0-10 Fogginess   0-10 Comments   Baseline N/A 0 0 0 0    Smooth Pursuit  0 0 0 0 Notes "blurry" at end ranges, saccades observed   Saccades - Horizontal  0 0 0 0    Saccades - Vertical   0 0 0 0 Slowed going down    Convergence (Near Point)  0 0 0 0 (Near Point in cm):  Measure 1: 16  Measure 2: 15  Measure 3: 16   VOR - Horizontal  0 0 0 0 Eye pressure 3/10   VOR - Vertical  0 0 0 0 Eye pressure 3/10    Visual Motion Sensitivity Test  0 0 0 0 Eye pressure 3/10        Coordination:   - fine motor: intact  - UE coordination: intact    - LE coordination:  intact      Modified Balance Error Scoring System (mBESS) testing:               Non-dominant foot: left   Eyes closed    Firm Foam   Double Leg Stance 0    0   Single Leg Stance 0    5 " "  Tandem Stance 1    4   Total Errors 1    9         Functional Gait Assessment:   1. Gait on level surface =  3  2. Change in Gait Speed = 3  3. Gait with horizontal head turns  = 3  4. Gait with vertical head turns = 3  5. Gait with pivot turns = 3  6. Step over obstacle = 3  7. Gait with Narrow ELIZABETH = 3  8. Gait with eyes closed = 2  9. Ambulating Backwards = 2  10. Steps = 3    Score 28/30     Score:   <22/30 fall risk   <20/30 fall risk in older adults   <18/30 fall risk in Parkinsons      Intake Outcome Measure for FOTO Neuro Survey    Therapist reviewed FOTO scores for Derick Zee on 10/11/2024.   FOTO report - see Media section or FOTO account episode details.    Not taken       Treatment     Total Treatment time separate from Evaluation: 15 minutes    Derick received the treatments listed below:      neuromuscular re-education activities to improve: Balance, Coordination, and Proprioception for 15 minutes. The following activities were included:  VOR x 1 with B target on checkered background, 30 sec     VOR x 2 horizontal with "Q" target, seated, 2 x 30 sec     VOR cancel horizontal with "Q target, 2 x 30 sec  + dizziness and 1/10 headache         Patient Education and Home Exercises     Education provided:   - Physical Therapy Plan of Care, role of Physical Therapy, schedule, HEP     Written Home Exercises Provided: Yes.  Exercises were reviewed and Derick was able to demonstrate them prior to the end of the session.  Derick demonstrated good  understanding of the education provided.     Assessment     Derick is a 23 y.o. male referred to outpatient Physical Therapy with a medical diagnosis of concussion. Patient presents with reports of concussion sustained on 9/27/24 resulting in headache, light sensitivity, eye strain and sleep dysfunction.  He notes that headaches have mostly gone away, but has some "head pressure" above eyes.  He has mostly within normal limits oculomotor exam, but demo's " some blurriness at end ranges.  He has abnormal convergence at 15 cm and notes dizziness with accomodation.  He scored 28/30 on the FGA with most challenge walking with eyes closed and backward walking.  He also had instability standing on foam with eyes closed, in tandem and single leg stance.  He was given initial HEP of VOR x 2 and VOR cancel secondary to reports of dizziness on testing.  He will benefit from skilled Physical Therapy to address impairments and maximize mobility.     Patient prognosis is Good.   Patient will benefit from skilled outpatient Physical Therapy to address the deficits stated above and in the chart below, provide patient /family education, and to maximize patient's level of independence.     Plan of care discussed with patient: Yes  Patient's spiritual, cultural and educational needs considered and patient is agreeable to the plan of care and goals as stated below:     Anticipated Barriers for therapy: none    Medical Necessity is demonstrated by the following  History  Co-morbidities and personal factors that may impact the plan of care [x] LOW: no personal factors / co-morbidities  [] MODERATE: 1-2 personal factors / co-morbidities  [] HIGH: 3+ personal factors / co-morbidities    Moderate / High Support Documentation:   Co-morbidities affecting plan of care: concussion     Personal Factors:   no deficits     Examination  Body Structures and Functions, activity limitations and participation restrictions that may impact the plan of care [] LOW: addressing 1-2 elements  [x] MODERATE: 3+ elements  [] HIGH: 4+ elements (please support below)    Moderate / High Support Documentation: vestibular, balance, headache     Clinical Presentation [x] LOW: stable  [] MODERATE: Evolving  [] HIGH: Unstable     Decision Making/ Complexity Score: low       Goals:  Short Term Goals = Long Term Goals: 6 weeks   Patient to perform HEP Independent   Patient to have convergence at less than or equal to 10  cm for improved reading and to attain normal range  Patient able to perform VOR cancel with no reports of eye pressure for improved motion tolerance  Patient to have less than or equal to 5 total errors on SANDHYA test for improved balance  Plan     Plan of care Certification: 10/11/2024 to 11/22/24.    Outpatient Physical Therapy 1 times weekly for 6 weeks to include the following interventions: Gait Training, Manual Therapy, Neuromuscular Re-ed, Patient Education, Self Care, Therapeutic Activities, and Therapeutic Exercise, vestibular.     Veronica Randall PT        Physician's Signature: _________________________________________ Date: ________________

## 2024-10-11 NOTE — PROGRESS NOTES
[facility] OUTPATIENT THERAPY AND WELLNESS  Physical Therapy Neurological Rehabilitation Initial Evaluation     Name: Derick Zee  Clinic Number: [id number]    Therapy Diagnosis: No diagnosis found.  Physician: Jorge Mireles DO    Physician Orders: PT Eval and Treat   Medical Diagnosis from Referral: ***  Evaluation Date: 10/11/2024  Authorization Period Expiration: ***  Plan of Care Expiration: ***  Progress Note Due: ***  Date of Surgery: ***  Visit # / Visits authorized: ***/ ***  FOTO: ***/ 3    Precautions: {IP WOUND PRECAUTIONS OHS:21546}    Time In: 10:30  Time Out: ***  Total Billable Time: *** minutes    Subjective      Date of onset: 9/27/24    History of current condition - Derick reports: Started softball practice, and the ball is blurry.  A little dizziness after catching, lasted about half hour.  More sensitivity to light.  Has been using blue light glasses, was doing this prior.   Back at light contact exercise, bull pens, has an AT at school he is working with.      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 He states he immediately feel to the ground and had blurry vision. He continues to appreciate pressure behind his eyes and denies improvement in his symptoms since his initial injury.     Imaging, no updated imaging    Prior Therapy: Outpatient Physical Therapy   Social History: Lives on campus  Home Environment: apt   Exercise Routine / History: [employer] baseball   Family Present at time of Eval: none   Occupation: school, base ball player  Prior Level of Function: Independent   Current Level of Function: Eye strain with phone     First concussion,     Pain:  Current 0/10,   Denies any headaches  Pressure in frontal above eyes     Patient's goals: Return to     Medical History:   No past medical history on file.    Surgical History:   Derick Zee  has no  past surgical history on file.    Medications:   Derick currently has no medications in their medication list.    Allergies:   Review of patient's allergies indicates:  No Known Allergies     Objective      - Follows commands: 100% of time   - Speech: within normal limits       Mental status: alert, oriented to person, place, and time, normal mood, behavior, speech, dress, motor activity, and thought processes  Appearance: Casually dressed  Behavior:  cooperative  Attention Span and Concentration:  Normal    Sensation: Light Touch: Intact      Visual/Auditory:   Oculomotor ROM:   Eye Alignment:   Visual Field:   Acuity: {visual acuity OT:30154}    Vestibular/Ocular-Motor Screening (VOMS) for Concussion  Vestibular/Ocular Motor Test: Not Tested Headache   0-10 Dizziness  0-10 Nausea   0-10 Fogginess   0-10 Comments   Baseline N/A 0 0 0 0    Smooth Pursuit  0 0 0 0 Blurry at end ranges    Saccades - Horizontal  0 0 0 0    Saccades - Vertical   0 0 0 0 Slowed going down    Convergence (Near Point)  0 0 0 0 (Near Point in cm):  Measure 1: 16  Measure 2: 15  Measure 3: 16   VOR - Horizontal  0 0 0 0 Eye pressure 3/10   VOR - Vertical  0 0 0 0 Eye pressure 3/10    Visual Motion Sensitivity Test  0 0 0 0 Eye pressure 3/10        Coordination:   - fine motor: intact  - UE coordination: intact    - LE coordination:  intact      CERVICAL SPINE  Flexion *** degrees (80-90 deg)  Extension *** degrees (70-80 deg)  L side bend *** degrees, R side bend *** degrees (20-45 degrees)  L rotation *** degrees, R rotation *** degrees (70-90 degrees)  Are concurrent symptoms present with any of these movements***        Modified Balance Error Scoring System (mBESS) testing:               Non-dominant foot: left   Eyes closed    Firm Foam   Double Leg Stance 0    0   Single Leg Stance 0    5   Tandem Stance 1    4   Total Errors 1    9         Functional Gait Assessment:   1. Gait on level surface =  3  2. Change in Gait Speed = ***  3.  "Gait with horizontal head turns  = ***  4. Gait with vertical head turns = ***  5. Gait with pivot turns = 3  6. Step over obstacle = 3  7. Gait with Narrow ELIZABETH = 3  8. Gait with eyes closed = 2  9. Ambulating Backwards = 3  10. Steps = ***    Score ***/30     Score:   <22/30 fall risk   <20/30 fall risk in older adults   <18/30 fall risk in Parkinsons      Intake Outcome Measure for FOTO *** Survey    Therapist reviewed FOTO scores for Derick Zee on 10/11/2024.   FOTO report - see Media section or FOTO account episode details.    Intake Score: ***%       Treatment     Total Treatment time separate from Evaluation: *** minutes    Derick received the treatments listed below:      therapeutic exercises to develop {AMB PT PROGRESS OBJECTIVE:82610} for *** minutes including:  ***    neuromuscular re-education activities to improve: Balance, Coordination, and Proprioception for 15 minutes. The following activities were included:  VOR x 1 with B target on checkered backrground     VOR x 2 with "Q" target, seated    VOR cancel with "Q target + dizziness  1/10 headache         Patient Education and Home Exercises     Education provided:   - ***    Written Home Exercises Provided: {Home Exercise Program:57574}.  Exercises were reviewed and Derick was able to demonstrate them prior to the end of the session.  Derick demonstrated {Desc; good/fair/poor:59071} understanding of the education provided.     Assessment     Derick is a 23 y.o. male referred to outpatient Physical Therapy with a medical diagnosis of ***. Patient presents with ***    Patient prognosis is {REHAB PROGNOSIS OHS:39385}.   Patient will benefit from skilled outpatient Physical Therapy to address the deficits stated above and in the chart below, provide patient /family education, and to maximize patient's level of independence.     Plan of care discussed with patient: {YES:83138}  Patient's spiritual, cultural and educational needs considered and " patient is agreeable to the plan of care and goals as stated below:     Anticipated Barriers for therapy: ***    Medical Necessity is demonstrated by the following  History  Co-morbidities and personal factors that may impact the plan of care [] LOW: no personal factors / co-morbidities  [] MODERATE: 1-2 personal factors / co-morbidities  [] HIGH: 3+ personal factors / co-morbidities    Moderate / High Support Documentation:   Co-morbidities affecting plan of care: ***    Personal Factors:   {Personal Factors:17322}     Examination  Body Structures and Functions, activity limitations and participation restrictions that may impact the plan of care [] LOW: addressing 1-2 elements  [] MODERATE: 3+ elements  [] HIGH: 4+ elements (please support below)    Moderate / High Support Documentation: ***     Clinical Presentation [] LOW: stable  [] MODERATE: Evolving  [] HIGH: Unstable     Decision Making/ Complexity Score: {Desc; low/moderate/high:224694}       Goals:  Short Term Goals: *** weeks   ***    Long Term Goals: *** weeks   ***  Plan     Plan of care Certification: 10/11/2024 to ***.    Outpatient Physical Therapy {NUMBERS 1-5:30581} times weekly for {NUMBER 1-16:44112} weeks to include the following interventions: {TX PLAN:63315}.     Veronica Randall PT        Physician's Signature: _________________________________________ Date: ________________

## 2024-10-16 ENCOUNTER — CLINICAL SUPPORT (OUTPATIENT)
Dept: REHABILITATION | Facility: HOSPITAL | Age: 24
End: 2024-10-16
Payer: COMMERCIAL

## 2024-10-16 ENCOUNTER — OFFICE VISIT (OUTPATIENT)
Dept: SPORTS MEDICINE | Facility: CLINIC | Age: 24
End: 2024-10-16
Payer: COMMERCIAL

## 2024-10-16 VITALS
BODY MASS INDEX: 27.28 KG/M2 | SYSTOLIC BLOOD PRESSURE: 120 MMHG | DIASTOLIC BLOOD PRESSURE: 77 MMHG | HEIGHT: 71 IN | WEIGHT: 194.88 LBS | HEART RATE: 72 BPM

## 2024-10-16 DIAGNOSIS — M99.00 CRANIAL SOMATIC DYSFUNCTION: ICD-10-CM

## 2024-10-16 DIAGNOSIS — M99.07 SOMATIC DYSFUNCTION OF UPPER EXTREMITY: ICD-10-CM

## 2024-10-16 DIAGNOSIS — M99.01 SOMATIC DYSFUNCTION OF CERVICAL REGION: ICD-10-CM

## 2024-10-16 DIAGNOSIS — S06.0X0D CONCUSSION WITHOUT LOSS OF CONSCIOUSNESS, SUBSEQUENT ENCOUNTER: Primary | ICD-10-CM

## 2024-10-16 DIAGNOSIS — M99.02 SOMATIC DYSFUNCTION OF THORACIC REGION: ICD-10-CM

## 2024-10-16 DIAGNOSIS — M99.08 SOMATIC DYSFUNCTION OF RIB CAGE REGION: ICD-10-CM

## 2024-10-16 PROCEDURE — 1159F MED LIST DOCD IN RCRD: CPT | Mod: CPTII,S$GLB,, | Performed by: ORTHOPAEDIC SURGERY

## 2024-10-16 PROCEDURE — 3078F DIAST BP <80 MM HG: CPT | Mod: CPTII,S$GLB,, | Performed by: ORTHOPAEDIC SURGERY

## 2024-10-16 PROCEDURE — 97112 NEUROMUSCULAR REEDUCATION: CPT | Mod: PO

## 2024-10-16 PROCEDURE — 99214 OFFICE O/P EST MOD 30 MIN: CPT | Mod: 25,S$GLB,, | Performed by: ORTHOPAEDIC SURGERY

## 2024-10-16 PROCEDURE — 98927 OSTEOPATH MANJ 5-6 REGIONS: CPT | Mod: S$GLB,,, | Performed by: ORTHOPAEDIC SURGERY

## 2024-10-16 PROCEDURE — 97530 THERAPEUTIC ACTIVITIES: CPT | Mod: PO

## 2024-10-16 PROCEDURE — 3008F BODY MASS INDEX DOCD: CPT | Mod: CPTII,S$GLB,, | Performed by: ORTHOPAEDIC SURGERY

## 2024-10-16 PROCEDURE — 1160F RVW MEDS BY RX/DR IN RCRD: CPT | Mod: CPTII,S$GLB,, | Performed by: ORTHOPAEDIC SURGERY

## 2024-10-16 PROCEDURE — 3074F SYST BP LT 130 MM HG: CPT | Mod: CPTII,S$GLB,, | Performed by: ORTHOPAEDIC SURGERY

## 2024-10-16 PROCEDURE — 99999 PR PBB SHADOW E&M-EST. PATIENT-LVL III: CPT | Mod: PBBFAC,,, | Performed by: ORTHOPAEDIC SURGERY

## 2024-10-16 NOTE — PROGRESS NOTES
"[facility]   Physical Therapy Treatment Note      Name: Derick Zee  Clinic Number: [id number]    Therapy Diagnosis:   Encounter Diagnosis   Name Primary?    Concussion without loss of consciousness, subsequent encounter Yes     Physician: No ref. provider found    Visit Date: 10/16/2024    Physician: Jorge Mireles DO     Physician Orders: PT Eval and Treat Vestibular therapy  Medical Diagnosis from Referral: Concussion without loss of consciousness, subsequent encounter [S06.0X0D]   Evaluation Date: 10/11/2024  Authorization Period Expiration: 12/31/24  Plan of Care Expiration: 11/22/24  Progress Note Due: 11/8/24  Visit # / Visits authorized: 1/ 10  FOTO: 0/ 3     Precautions: Standard     Time In: 08:30  Time Out:   Total Billable Time: 55 minutes    PTA Visit #: 0/5       Subjective     Patient reports: Sleeping better. [place] noise can[place].   He was compliant with home exercise program.  Response to previous treatment: felt ok   Functional change: on going    Pain: 2/10  Location: head pressure    Objective      Objective Measures updated at progress report unless specified.     Treatment     Derick received the treatments listed below:        neuromuscular re-education activities to improve: Balance, Coordination, and Proprioception for 45 minutes. The following activities were included:  1 X 30 sec VOR x 1 horizontal, seated, "X" target with checkered back ground  1 X 30 sec VOR x 1 vertical, seated, "X" target with checkered background    2 X 30 sec VOR x 2  horizontal with checkered background  2 X 30 sec VOR x 2  horizontal with checkered background    2 X 30 sec saccades with checkered back ground, horizontal    2 X 30 sec saccades with checkered back ground, vertical     2 x 5 kasandra string with 3 beads   Convergence 15 cm     2 x 5 accomodation, with kasandra string  2 x 5 accomodation with "q" target near and distant " target on wall    On foam:  2 X 30 sec feet together with head rotation  2 X 30 sec feet together with eyes closed   2 x 30 sec tandem stance on foam    therapeutic activities to improve functional performance for 10  minutes, includin feet x 2 walking with soccer ball toss hand to hand  100 feet x 2 walking with soccer ball toss hand to hand with cognitive task  100 feet x 2 walking with soccer ball forward pass to therapist   100 feet x 2 walking with soccer ball pass back to therapist and retrieve on opposite side        Patient Education and Home Exercises       Education provided:   - benefits of todays intervention, vestibular Physical Therapy     Written Home Exercises Provided: Yes. Exercises were reviewed and Derick was able to demonstrate them prior to the end of the session.  Derick demonstrated good  understanding of the education provided. See Electronic Medical Record under Patient Instructions for exercises provided during therapy sessions    Assessment     Derick participated well in today's session, he notes slight increase in head pressure to 3/10 post session. He had difficulty with vertical saccades and vertical VOR x 2 at end range, notes increased eye fatigue.  Also with increased difficulty performing accomodation exercises, eyes take increased time to focus on near target and self reports of eye strain. During walking motion sensitivity tasks he has blurriness following soccer ball through arch of motion.  He will continue to benefit from skilled Physical Therapy to address impairments and maximize mobility.     Derick Is progressing well towards his goals.   Patient prognosis is Good.     Patient will continue to benefit from skilled outpatient physical therapy to address the deficits listed in the problem list box on initial evaluation, provide pt/family education and to maximize pt's level of independence in the home and community environment.     Patient's spiritual, cultural  and educational needs considered and pt agreeable to plan of care and goals.     Anticipated barriers to physical therapy: none    Goals: Short Term Goals = Long Term Goals: 6 weeks   Patient to perform HEP Independent   Patient to have convergence at less than or equal to 10 cm for improved reading and to attain normal range  Patient able to perform VOR cancel with no reports of eye pressure for improved motion tolerance  Patient to have less than or equal to 5 total errors on SANDHYA test for improved balance    Plan     Cont to progress vestibular, oculomotor exercises.     Veronica Randall, PT

## 2024-10-16 NOTE — PROGRESS NOTES
Subjective:     Derick is here today for follow up evaluation of a closed head injury sustained 09/27/2024. Patient states he is feeling 75-80% improved at this time. He has been working through the return to protocol with his  and admits to catching two bull-pens without appreciating an increase in concussion symptoms. He continues to endorse pressure behind his eyes, but is otherwise feeling greatly improved.     Recall from visit on 10/08/2024  Derick is here today for follow up evaluation of closed head injury sustained 09/27/2024. He states he is now feeling 65-70% improved but continues to note worsening pressure in his head with exertion. He denies new injury or trauma since his last visit. He states he has been working with his  and has progressed through three steps of the RTP protocol and notes worsening symptoms during and after exercise.     Recall from visit on 10/01/2024  Derick, a 23 y.o. male is here today for evaluation of a closed head injury. DOI: 09/27/2024. No LOC from event. He states he was participating in a scrimmage when he was hit in the back of the head by the batter's back swing. He immediately appreciated blurry vision and states he fell to the ground. He states he completed playing the remainder of the game. He continues to appreciate pressure affecting the front of his head and behind his eyes.     School / grade: [employer] / senior   Sport: Baseball  Position: Catcher  Dominant hand: Right   How many concussions have you have in the past? 0  When was your most recent concussion & how long was recovery? NA  Have you ever been hospitalized or had medical imaging done for a head injury? No  Have you ever been diagnosed with headaches or migraines? No  Do you have a learning disability / dyslexia? No  Do you have ADD/ADHD? No  Have you been diagnosed with depression, anxiety or other psychiatric disorder? No  Do you have a history of motion sickness? No  Do  "you have a history of syncope? No  Do you have a history of epilepsy/seizures? No  Do you wear glasses or contacts? No    If so, when was your last vision exam? NA  Have you taken a baseline ImPACT examination? Yes       10/1/24 10/7/24 10/16/24   Symptom Evaluation  0-6 0-6 0-6   Headache 3 0 0   "Pressure in head" 3 2 2   Neck pain  0 0 0   Nausea or vomiting 0 0 0   Dizziness 0 0 0   Blurred vision 0 0 0   Balance problems 0 0 0   Sensitivity to light 2 1 1   Sensitivity to noise  3 1 0   Feeling slowed down 0 0 0   Feeling like "in a fog" 0 0 0   "Don't feel right" 1 2 0   Difficulty concentrating 0 0 0   Difficulty remembering  0 0 0   Fatigue or low energy 1 2 0   Confusion  0 0 0   Drowsiness 0 0 0   More emotional 0 0 0   Irritability  1 1 0   Sadness 0 0 0   Nervous or Anxious 0 0 0   Trouble falling asleep 3 2 0           Total # of symptoms 8/22 7/22 2/22   Symptom severity score 17/132 11/132 3/132     HPI template based on:  1) Consensus statement on concussion in sport--the 5th international conference on concussion in sport held in [place], October 2016  2) Sport concussion assessment tool--5th edition    PAST MEDICAL HISTORY:  History reviewed. No pertinent past medical history.    SURGICAL HISTORY:  History reviewed. No pertinent surgical history.    FAMILY HISTORY:  No family history on file.    SOCIAL HISTORY:   has no history on file for tobacco use, alcohol use, and drug use.    MEDICATIONS:  No current outpatient medications on file.    ALLERGIES:  Review of patient's allergies indicates:  No Known Allergies      Objective:     PHYSICAL EXAMINATION:  /77   Pulse 72   Ht 5' 11" (1.803 m)   Wt 88.4 kg (194 lb 14.2 oz)   BMI 27.18 kg/m²   Vitals signs and nursing note have been reviewed.  General: In no acute distress, well developed, well nourished, no diaphoresis  Eyes: no eye redness or discharge  HENT: normocephalic and atraumatic, neck supple, trachea midline, no nasal discharge, no " external ear redness or discharge. No evidence of ronald orbital raccoon sign to suggest orbital fracture or mastoid process austin sign to suggest basilar skull fracture on observation. No significant pain with cranial compression to suggest skull fracture upon palpation.   Cardiovascular: 2+ and symmetric radial and DP pulses bilaterally, no LE edema  Lungs: respirations non-labored, no conversational dyspnea   Abd: non-distended, no rigidity  Skin: No rashes, warm and dry  Psychiatric: cooperative, pleasant, mood and affect appropriate for age    NEURO EXAM:  Sensation to light touch intact for UE and LE dermatomes  CN II-XII intact suggesting no intracranial hemorrhage  Upper limb and lower limb coordination: normal  Finger-to-nose testing: appropriate    Strength Testing: ('*' = with pain)           Upper Extremity  Deltoid                                    5/5 B/L  Biceps               5/5 B/L  Triceps               5/5 B/L  Wrist Flexion   5/5 B/L  Wrist Extension  5/5 B/L      5/5 B/L  Finger ABduction  5/5 B/L  Finger ABduction  5/5 B/L  EPL (Ext. pollicis longus) 5/5 B/L  Pinch Mechanism  5/5 B/L    Lower Extremity  Hip Flexion   5/5 B/L  Hamstrings   5/5 B/L  Quadraceps              5/5 B/L  Ankle Dorsiflexion  5/5 B/L  Ankle Plantarflexion  5/5 B/L  Ankle Inversion  5/5 B/L  Ankle Eversion  5/5 B/L  EHL (Ext. hollicis longus) 5/5 B/L       Special Tests:                          Spurling's  Negative B/L  Seated SLR  Negative B/L    Modified Balance Error Scoring System (mBESS) testing:    Non-dominant foot: left   Testing surface: Hard floor, shoes on   Number of Errors   Double Leg Stance 0     Single Leg Stance 1     Tandem Stance 0     Total Errors 1       Vestibular/Ocular-Motor Screening (VOMS) Testing:   Headache Dizziness Nausea Fogginess Comments   Symptom severity prior to test 0   0   0   0   endoreses generalized pressure front of head and moreso behind his left eye     VOM Test         Smooth Pursuits 0   0   0   0   No data recorded   Saccades - Horizontal 0   0   0   0   No data recorded   Saccades - Vertical 0   0   0   0   No data recorded   Congergence 0   0   0   0   Measurements (cm):    (S) > 12 cm, > 12 cm, >12 cm // endoreses slightly worsening pressure behind his eyes     VOR - Horizontal 0   0   0   0   No data recorded   VOR - Vertical 1   0   0   0   Endorses increased pressure behind his eyes     Visual Motion Sensitivity Test 1   0   0   0   Endorses increased pressure behind his eyes         MUSCULOSKELETAL EXAM:    Posture:  Upright  Neck examination:  Range of motion: Normal  Tenderness: None    TART (Tissue texture abnormality, Asymmetry,  Restriction of motion and/or Tenderness) changes:    Head:     - Cranial Rhythmic Impulse (CRI): unrestricted with normal amplitude   - Strain Patterns: absent      Suture/Bone Restriction Side   Occipitomastoid (OM)  Present R   Parietal mastoid (PM) Present B   Sphenosquamous pivot (SSP) Absent    Zygomaticotemporal (ZT) Absent    Zygomaticofrontal (ZF) Absent    Frontonasal Absent    Lodi bone Absent    Parietal bone Absent    Frontal bone Present B       Occipitoatlantal (OA) Joint: ES-right, R-left     Cervical Spine   C1 Neutral   C2 FRS LEFT   C3 Neutral   C4 Neutral   C5 Neutral   C6 ERS RIGHT   C7 FRS RIGHT      Thoracic Spine   T1 Neutral   T2 ERS RIGHT   T3 ERS RIGHT   T4 Neutral   T5 Neutral   T6 Neutral   T7 Neutral   T8 Neutral   T9 Neutral   T10 Neutral   T11 Neutral   T12 Neutral     Rib cage:   Superior rib 1 left    Upper extremity:  MFR right thoracic outlet      Lumbar Spine   L1 Neutral   L2 Neutral   L3 Neutral   L4 Neutral   L5 Neutral     Pelvis:    Sacrum:    Key   F= Flexed   E = Extended   R = Rotated   N = Neutral   S = Sidebent   TTA = tissue texture abnormality   L/R/B (last letter) = left/right/bilateral   HTP = fascial herniated trigger point   TB = fascial trigger band   CD = fascial continuum distortion        Assessment:     Encounter Diagnoses   Name Primary?    Concussion without loss of consciousness, subsequent encounter Yes    Somatic dysfunction of upper extremity     Somatic dysfunction of cervical region     Cranial somatic dysfunction     Somatic dysfunction of thoracic region     Somatic dysfunction of rib cage region      First time concussion, w/out loss of consciousness  No evidence of myelopathy / spinal cord pathology  No evidence of focal neurologic deficit  No evidence of skull fracture    Plan:     1) Reassuring evaluation, though symptoms remain.    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 He states he immediately feel to the ground and had blurry vision. He continues to appreciate pressure behind his eyes and denies improvement in his symptoms since his initial injury.     - He is now feeling 75-80% improved and has been attending vestibular therapy in addition to working through the return to play protocol with his .     - Based on his description/body language of pain and somatic dysfunction identified on exam, I discussed osteopathic manipulation as a treatment option today.  He consents to evaluation and treatment.  See below.    - Continue with HEP for vestibular retraining prescribed at initial visit.     - Continue with vestibular therapy as scheduled.     - Continue with daily symptoms/severity scores.       Yes (+) or No (-) Comments   Neuropsychological testing     Administered, reviewed, and shared with the patient (and family, if present) at this visit. -    Mental activity     School attendance allowed? +    w/ concussion accommodations? -    Social activity     In person, telephone, and text interactions limited? +    Physical activity (e.g. sports, work)     sports participation prohibited? - Continue with RTP protocol   Clinic contact w/   today to discuss plan? + School: [employer]  : Preston Castillo       2) Education:   - Education provided on the diagnosis of concussion. We reviewed the signs and symptoms of concussion, current knowledge on concussions, and the importance of brain and physical rest until symptom resolution. Once symptoms are improving/improved, a progressive return to activity under daily guidance is initiated. We discussed second impact syndrome, that all concussions are significant, and that we cannot predict when one will result in residual symptoms or the development of sequelae later in life. We also reviewed that concussions also often are a whiplash event that can have mechanical head, neck, and upper back symptoms that may improve/resolve with osteopathic manipulative treatment. I advised that concussion is a clinical diagnosis and we take into account many factors including mechanism of injury, symptoms and symptom severity, and physical examination focusing on the neurologic and visual symptoms.    3) OMT 5-6 regions. Oral consent obtained by patient and parent.  Reviewed benefits and potential side effects. Parent present in the room during entire evaluation and treatment.  - OMT indicated today due to signs and symptoms as well as local and remote somatic dysfunction findings and their related neurokinetic, lymphatic, fascial and/or arteriovenous body connections.   - OMT techniques used: Soft Tissue, Myofascial Release, Muscle Energy, High Velocity Low Amplitude, and Cranial    - Treatment was tolerated well. Improvement noted in segmental mobility post-treatment in dysfunctional regions. There were no adverse events and no complications immediately following treatment.     4) Follow up in 1 week if unable to finish the RTP protocol or if symptoms do not resolve with continued vestibular therapy.  -  upon successful completion of RTP protocol per SCAT5, pt/family/AT will contact the clinic,  and the clinic will document successful completion  - if any Step of RTP protocol per SCAT5 is failed, pt/family/AT will immediately alert the clinic for further evaluation    - Should symptoms acutely worsen, or should new symptoms arise, the patient should call the clinic, but if unavailable immediately present to the Emergency Department for further evaluation.    5) Patient and parent/family/ATC agreeable to today's plan and all questions were answered      This note is dictated using the M*Modal Fluency Direct word recognition program. There are word recognition mistakes that are occasionally missed on review.

## 2024-10-18 ENCOUNTER — CLINICAL SUPPORT (OUTPATIENT)
Dept: REHABILITATION | Facility: HOSPITAL | Age: 24
End: 2024-10-18
Payer: COMMERCIAL

## 2024-10-18 DIAGNOSIS — S06.0X0D CONCUSSION WITHOUT LOSS OF CONSCIOUSNESS, SUBSEQUENT ENCOUNTER: Primary | ICD-10-CM

## 2024-10-18 PROCEDURE — 97530 THERAPEUTIC ACTIVITIES: CPT | Mod: PO

## 2024-10-18 PROCEDURE — 97112 NEUROMUSCULAR REEDUCATION: CPT | Mod: PO

## 2024-10-18 NOTE — PROGRESS NOTES
"[facility]   Physical Therapy Treatment Note      Name: Derick Zee  Clinic Number: [id number]    Therapy Diagnosis:   Encounter Diagnosis   Name Primary?    Concussion without loss of consciousness, subsequent encounter Yes       Physician: Jorge Mireles DO    Visit Date: 10/18/2024    Physician: Jorge Mireles DO     Physician Orders: PT Eval and Treat Vestibular therapy  Medical Diagnosis from Referral: Concussion without loss of consciousness, subsequent encounter [S06.0X0D]   Evaluation Date: 10/11/2024  Authorization Period Expiration: 12/31/24  Plan of Care Expiration: 11/22/24  Progress Note Due: 11/8/24  Visit # / Visits authorized: 2/ 10  FOTO: 0/ 3     Precautions: Standard     Time In: 10:30  Time Out: 11:10  Total Billable Time: 40 minutes    PTA Visit #: 0/5       Subjective     Patient reports: Keeps feeling better. He was cleared to starting practicing swings.  Was catching yesterday for about 1 hour and half.  He is really tired today, he is still waking up at night.    He was compliant with home exercise program.  Response to previous treatment: felt tired  Functional change: on going    Pain: 1.5/10  Location: head pressure    Objective      Objective Measures updated at progress report unless specified.     Treatment     Derick received the treatments listed below:        neuromuscular re-education activities to improve: Balance, Coordination, and Proprioception for 25 minutes. The following activities were included:  Seated:   1 X 30 sec VOR x 1 horizontal, seated, "X" target with checkered back ground  1 X 30 sec VOR x 1 vertical, seated, "X" target with checkered background    2 X 30 sec VOR x 2  horizontal with checkered background  2 X 30 sec VOR x 2  horizontal with checkered background    2 x 30 sec VOR cancel, horizontal, standing, B target on checkered background    X 10 pencil push up with "Q"   2 x 5 kasandra string with 3 beads   Convergence 22 cm " "    2 x 5 accomodation with "Q" target and target about 6 ft away   2 x 5 accomodation with "q" target near and distant target on wall      therapeutic activities to improve functional performance for 15  minutes, includin feet x 2 walking with VOR x 1 horizontal  100 feet x 2 walking with VOR x 1 vertical      100 feet x 2 walking with soccer ball toss hand to hand  100 feet x 2 walking with soccer ball toss hand to hand with cognitive task  100 feet x 2 walking with soccer ball forward pass to therapist   100 feet x 2 walking with soccer ball pass back to therapist and retrieve on opposite side        Patient Education and Home Exercises       Education provided:   - benefits of todays intervention, vestibular Physical Therapy, sleep hygiene     Written Home Exercises Provided: Yes. Exercises were reviewed and Derick was able to demonstrate them prior to the end of the session.  Derick demonstrated good  understanding of the education provided. See Electronic Medical Record under Patient Instructions for exercises provided during therapy sessions    Assessment     Derick participated well in today's session, despite increased fatigue. His convergence is a little more impaired today likely secondary to fatigue.  Discussed sleep hygiene since he is getting on his phone in the middle of the night when he wakes up. Also continues to have some blurriness with ball toss forward as well as hand to hand.  He will continue to benefit from skilled Physical Therapy to address impairments and maximize mobility.     Derick Is progressing well towards his goals.   Patient prognosis is Good.     Patient will continue to benefit from skilled outpatient physical therapy to address the deficits listed in the problem list box on initial evaluation, provide pt/family education and to maximize pt's level of independence in the home and community environment.     Patient's spiritual, cultural and educational needs considered " and pt agreeable to plan of care and goals.     Anticipated barriers to physical therapy: none    Goals: Short Term Goals = Long Term Goals: 6 weeks   Patient to perform HEP Independent   Patient to have convergence at less than or equal to 10 cm for improved reading and to attain normal range  Patient able to perform VOR cancel with no reports of eye pressure for improved motion tolerance  Patient to have less than or equal to 5 total errors on SANDHYA test for improved balance    Plan     Cont to progress vestibular, oculomotor exercises.     Veronica Randall, PT

## 2024-10-21 NOTE — PROGRESS NOTES
"[facility]   Physical Therapy Treatment Note      Name: Derick Zee  Clinic Number: [id number]    Therapy Diagnosis:   Encounter Diagnosis   Name Primary?    Concussion without loss of consciousness, subsequent encounter Yes         Physician: Jorge Mireles DO    Visit Date: 10/22/2024    Physician: Jorge Mireles DO     Physician Orders: PT Eval and Treat Vestibular therapy  Medical Diagnosis from Referral: Concussion without loss of consciousness, subsequent encounter [S06.0X0D]   Evaluation Date: 10/11/2024  Authorization Period Expiration: 12/31/24  Plan of Care Expiration: 11/22/24  Progress Note Due: 11/8/24  Visit # / Visits authorized: 3/ 10  FOTO: 0/ 3     Precautions: Standard     Time In: 8:46  Time Out: 9:30  Total Billable Time: 44 minutes    PTA Visit #: 0/5       Subjective     Patient reports: He is "hanging in there" did not sleep well last night and had more intense practice last night he has more fatigue and eye pressure secondary to that  He was compliant with home exercise program.  Response to previous treatment: felt tired  Functional change: on going    Pain: 3/10  Location: head pressure around eyes     Objective      Objective Measures updated at progress report unless specified.     Treatment     Derick received the treatments listed below:      neuromuscular re-education activities to improve: Balance, Coordination, and Proprioception for 24 minutes. The following activities were included:    Seated:   2 X 30 sec VOR x 1 horizontal, seated, "B" target with checkered back ground   2 X 30 sec VOR x 1 vertical, seated, "B" target with checkered background    2 X 30 sec VOR x 2  horizontal with checkered background   2 X 30 sec VOR x 2  horizontal with checkered background     2 x 30 sec VOR cancel, horizontal, standing, B target on checkered background    X 10 pencil push up with "Q"   2 x 5 kasandra string with 3 beads    Convergence 22 cm     At ballet " bar:  2 x 30 sec EO, tandem, on floor  2 x 30 sec EO, tandem on airex  2 x 30 second EC tandem on airex    therapeutic activities to improve functional performance for 20 minutes, including:  1 x 100 feet walking with VOR x 1 horizontal with X target  1 x 100 feet walking with VOR x 1 vertical with X target    100 feet x 2 walking with tennis ball toss hand to hand   100 feet x 2 walking with soccer ball forward pass to therapist with cognitive task  100 feet x 2 walking with soccer ball pass back to therapist and retrieve on opposite side  100 feet x 2 walking with racket ball catch with L hand, switch hands and toss back with R hand to simulate baseball catch and throw   Patient Education and Home Exercises       Education provided:   - benefits of todays intervention, vestibular Physical Therapy, sleep hygiene     Written Home Exercises Provided: Yes. Exercises were reviewed and Derick was able to demonstrate them prior to the end of the session.  Derick demonstrated good  understanding of the education provided. See Electronic Medical Record under Patient Instructions for exercises provided during therapy sessions    Assessment     Derick tolerated treatment session well, he did report increased fatigue following interventions that lingered for a little while but eventually returned closer to his baseline. He arrived in clinic more fatigued today to begin session likely secondary to his poor sleep quality and having a more intense practice yesterday. However, his convergence remains consistent with last visit. He reported most noticeable increase in fatigue when his eyes are moving through their end range of motion. Did not report any blurriness with ball tracking interventions however, increase in fatigue. He will continue to benefit from skilled Physical Therapy to address impairments and maximize mobility.     Derick Is progressing well towards his goals.   Patient prognosis is Good.     Patient will  continue to benefit from skilled outpatient physical therapy to address the deficits listed in the problem list box on initial evaluation, provide pt/family education and to maximize pt's level of independence in the home and community environment.     Patient's spiritual, cultural and educational needs considered and pt agreeable to plan of care and goals.     Anticipated barriers to physical therapy: none    Goals: Short Term Goals = Long Term Goals: 6 weeks   Patient to perform HEP Independent   Patient to have convergence at less than or equal to 10 cm for improved reading and to attain normal range  Patient able to perform VOR cancel with no reports of eye pressure for improved motion tolerance  Patient to have less than or equal to 5 total errors on SANDHYA test for improved balance    Plan     Cont to progress vestibular, oculomotor exercises.     Bess Fitzgerald, SPT    I certify that I was present in the room directing the student in service delivery and guiding them using my skilled judgment. As the co-signing therapist, I have reviewed the student's documentation and am responsible for the treatment, assessment and plan.    Amber Rivera, PT

## 2024-10-22 ENCOUNTER — CLINICAL SUPPORT (OUTPATIENT)
Dept: REHABILITATION | Facility: HOSPITAL | Age: 24
End: 2024-10-22
Attending: ORTHOPAEDIC SURGERY
Payer: COMMERCIAL

## 2024-10-22 DIAGNOSIS — S06.0X0D CONCUSSION WITHOUT LOSS OF CONSCIOUSNESS, SUBSEQUENT ENCOUNTER: Primary | ICD-10-CM

## 2024-10-22 PROCEDURE — 97530 THERAPEUTIC ACTIVITIES: CPT | Mod: PO

## 2024-10-22 PROCEDURE — 97112 NEUROMUSCULAR REEDUCATION: CPT | Mod: PO

## 2024-10-24 NOTE — PROGRESS NOTES
"[facility]   Physical Therapy Treatment Note      Name: Derick Zee  Clinic Number: [id number]    Therapy Diagnosis:   Encounter Diagnosis   Name Primary?    Concussion without loss of consciousness, subsequent encounter Yes       Physician: Jorge Mireles DO    Visit Date: 10/25/2024    Physician: Jorge Mireles DO     Physician Orders: PT Eval and Treat Vestibular therapy  Medical Diagnosis from Referral: Concussion without loss of consciousness, subsequent encounter [S06.0X0D]   Evaluation Date: 10/11/2024  Authorization Period Expiration: 24  Plan of Care Expiration: 24  Progress Note Due: 24  Visit # / Visits authorized: 4/ 10  FOTO: 0/ 3     Precautions: Standard     Time In: 08:45  Time Out: 09:25  Total Billable Time: 40 minutes    PTA Visit #: 0/5       Subjective     Patient reports: Still not sleeping well.  Practice has been going well, he was able to play in the game last week.   He was compliant with home exercise program.  Response to previous treatment: felt tired  Functional change: on going    Pain: 2/10  Location: head pressure around eyes     Objective      Objective Measures updated at progress report unless specified.     Treatment     Derick received the treatments listed below:      neuromuscular re-education activities to improve: Balance, Coordination, and Proprioception for 15 minutes. The following activities were included:    Standing feet together:  2 X 30 sec VOR x 2  horizontal with checkered background   2 X 30 sec VOR x 2 vertical  with checkered background   2 x 30 sec VOR cancel, horizontal, B target on checkered background  2 x 30 sec gaze shift horizontal with X targets on checkered back ground + dizziness    X 10 pencil push up with "Q"   2 x 5 kasandra string with 3 beads    Convergence ~19 cm         therapeutic activities to improve functional performance for 25 minutes, includin feet Lunge with twist  100 feet x " 2 Lateral lunge with upper extremity diagonal holding tennis ball   100 feet x 2 airplane with reach down then reach up + dizziness    X 5 squat with jump and turn, like baseball catch    100 feet x 2 walking with tennis ball toss hand to hand   100 feet x 2 walking with tennis ball forward toss to therapist with cognitive task  100 feet x 2 backward walking with teniss ball toss back to therapist     Time includes seated rest breaks  Patient Education and Home Exercises       Education provided:   - benefits of todays intervention, vestibular Physical Therapy, sleep hygiene     Written Home Exercises Provided: Yes. Exercises were reviewed and Derick was able to demonstrate them prior to the end of the session.  Derick demonstrated good  understanding of the education provided. See Electronic Medical Record under Patient Instructions for exercises provided during therapy sessions    Assessment     Derick tolerated treatment session fairly well with some headache and dizziness post session.  His symptoms appear exaggerated by fatigue with lack of sleep, given handout today and discussed sleep hygiene.  Performed increased dynamic activity with lateral lunge and airplane, he has some dizziness with all activity, requires increased seated rest breaks.  Continues to have difficulty with convergence and accomodation.  He remains appropriate for skilled Physical Therapy.     Derick Is progressing well towards his goals.   Patient prognosis is Good.     Patient will continue to benefit from skilled outpatient physical therapy to address the deficits listed in the problem list box on initial evaluation, provide pt/family education and to maximize pt's level of independence in the home and community environment.     Patient's spiritual, cultural and educational needs considered and pt agreeable to plan of care and goals.     Anticipated barriers to physical therapy: none    Goals: Short Term Goals = Long Term Goals: 6  weeks   Patient to perform HEP Independent   Patient to have convergence at less than or equal to 10 cm for improved reading and to attain normal range  Patient able to perform VOR cancel with no reports of eye pressure for improved motion tolerance  Patient to have less than or equal to 5 total errors on SANDHYA test for improved balance    Plan     Cont to progress vestibular, oculomotor exercises.     Veronica Randall, DPT, NCS

## 2024-10-25 ENCOUNTER — CLINICAL SUPPORT (OUTPATIENT)
Dept: REHABILITATION | Facility: HOSPITAL | Age: 24
End: 2024-10-25
Payer: COMMERCIAL

## 2024-10-25 DIAGNOSIS — S06.0X0D CONCUSSION WITHOUT LOSS OF CONSCIOUSNESS, SUBSEQUENT ENCOUNTER: Primary | ICD-10-CM

## 2024-10-25 PROCEDURE — 97112 NEUROMUSCULAR REEDUCATION: CPT | Mod: PO

## 2024-10-25 PROCEDURE — 97530 THERAPEUTIC ACTIVITIES: CPT | Mod: PO

## 2024-10-29 ENCOUNTER — CLINICAL SUPPORT (OUTPATIENT)
Dept: REHABILITATION | Facility: HOSPITAL | Age: 24
End: 2024-10-29
Payer: COMMERCIAL

## 2024-10-29 DIAGNOSIS — S06.0X0D CONCUSSION WITHOUT LOSS OF CONSCIOUSNESS, SUBSEQUENT ENCOUNTER: Primary | ICD-10-CM

## 2024-10-29 PROCEDURE — 97530 THERAPEUTIC ACTIVITIES: CPT | Mod: PO

## 2024-10-29 PROCEDURE — 97112 NEUROMUSCULAR REEDUCATION: CPT | Mod: PO

## 2024-11-08 ENCOUNTER — DOCUMENTATION ONLY (OUTPATIENT)
Dept: REHABILITATION | Facility: HOSPITAL | Age: 24
End: 2024-11-08
Payer: COMMERCIAL

## 2024-11-08 NOTE — PROGRESS NOTES
Called patient twice to offer appointment times for past 2 weeks, have not received a call back to schedule further appointments. Will attempt 1 more time prior to discharge from Physical Therapy.

## 2025-01-29 ENCOUNTER — TELEPHONE (OUTPATIENT)
Dept: SPORTS MEDICINE | Facility: CLINIC | Age: 25
End: 2025-01-29
Payer: COMMERCIAL

## 2025-01-29 DIAGNOSIS — T78.2XXS ANAPHYLAXIS, SEQUELA: ICD-10-CM

## 2025-01-29 DIAGNOSIS — L30.9 ECZEMA, UNSPECIFIED TYPE: ICD-10-CM

## 2025-01-29 DIAGNOSIS — Z91.09 MULTIPLE ENVIRONMENTAL ALLERGIES: Primary | ICD-10-CM

## 2025-01-29 RX ORDER — CLOBETASOL PROPIONATE 0.5 MG/G
CREAM TOPICAL 2 TIMES DAILY
Qty: 45 G | Refills: 0 | Status: SHIPPED | OUTPATIENT
Start: 2025-01-29

## 2025-01-29 RX ORDER — EPINEPHRINE 0.3 MG/.3ML
1 INJECTION SUBCUTANEOUS ONCE
Qty: 0.3 ML | Refills: 0 | Status: SHIPPED | OUTPATIENT
Start: 2025-01-29 | End: 2025-01-29

## 2025-01-29 NOTE — TELEPHONE ENCOUNTER
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 He states he is not currently taking any medications for this. He has been prescribed epi pens in the past due to respiratory distress with exposure to certain environmental allergens. He has gone through allergy testing and states that he was positive for "everything". Recently his skin has been more sensitive and irritated likely due to the snow and cold weather. He does not currently have any topical cream which has helped and he does not have an epi pen that is not .    Physical Exam:    Eczema lesions throughout trunk and arms. No signs of skin infection    Assessment:  1. Multiple environmental allergies    2. Eczema, unspecified type    3. Anaphylaxis, sequela          Plan:     Medications - discussed Xyzal OTC today, clobetasol 0.05% cream prescribed today  Epi pens prescribed due to history of anaphylaxis.    Referrals - possible referral to dermatology     ATR - Sport Participation: Full sport participation    Follow up - as needed             This note was completed in the presence of the physician noted above    This note is dictated using the M*Modal Fluency Direct word recognition program. There are word recognition mistakes that are occasionally missed on review.      "

## 2025-03-11 ENCOUNTER — OFFICE VISIT (OUTPATIENT)
Dept: URGENT CARE | Facility: CLINIC | Age: 25
End: 2025-03-11
Payer: COMMERCIAL

## 2025-03-11 VITALS
HEIGHT: 71 IN | WEIGHT: 188 LBS | RESPIRATION RATE: 14 BRPM | TEMPERATURE: 99 F | SYSTOLIC BLOOD PRESSURE: 126 MMHG | BODY MASS INDEX: 26.32 KG/M2 | DIASTOLIC BLOOD PRESSURE: 69 MMHG | HEART RATE: 83 BPM | OXYGEN SATURATION: 98 %

## 2025-03-11 DIAGNOSIS — J10.1 INFLUENZA A: ICD-10-CM

## 2025-03-11 DIAGNOSIS — R05.9 COUGH, UNSPECIFIED TYPE: Primary | ICD-10-CM

## 2025-03-11 LAB
CTP QC/QA: YES
POC MOLECULAR INFLUENZA A AGN: POSITIVE
POC MOLECULAR INFLUENZA B AGN: NEGATIVE

## 2025-03-11 PROCEDURE — 87502 INFLUENZA DNA AMP PROBE: CPT | Mod: QW,S$GLB,, | Performed by: NURSE PRACTITIONER

## 2025-03-11 PROCEDURE — 99213 OFFICE O/P EST LOW 20 MIN: CPT | Mod: S$GLB,,, | Performed by: FAMILY MEDICINE

## 2025-03-11 RX ORDER — OSELTAMIVIR PHOSPHATE 75 MG/1
75 CAPSULE ORAL 2 TIMES DAILY
Qty: 10 CAPSULE | Refills: 0 | Status: SHIPPED | OUTPATIENT
Start: 2025-03-11 | End: 2025-03-16

## 2025-03-11 RX ORDER — IPRATROPIUM BROMIDE 42 UG/1
2 SPRAY, METERED NASAL 4 TIMES DAILY
Qty: 15 ML | Refills: 0 | Status: SHIPPED | OUTPATIENT
Start: 2025-03-11

## 2025-03-11 NOTE — PATIENT INSTRUCTIONS
Rest  Fluids  Warm tea with honey and lemon  Warm salt water gargles  Warm sinus compresses  mucinex (guaifenesin)  Nasal saline  Tylenol or advil for pain or fever  IF no improvement or worsening, follow-up with PCP    You must be fever free for 24 hours without fever reducing medicines and symptom improvement to return to school    You must understand that you have received treatment at an Urgent Care facility only, and that you may be  released before all of your medical problems are known or treated. Urgent Care facilities are not equipped to  handle life threatening emergencies. It is recommended that you seek care at an Emergency Department for  further evaluation of worsening or concerning symptoms, or possibly life threatening conditions as  discussed.      If you develop chest pain, shortness of breath, throat swelling, tongue swelling, lightheadedness or any other causes for concern, proceed to ER.

## 2025-03-11 NOTE — PROGRESS NOTES
"Subjective:      Patient ID: Derick Zee is a 24 y.o. male.    Vitals:  height is 5' 11" (1.803 m) and weight is 85.3 kg (188 lb). His oral temperature is 99.3 °F (37.4 °C). His blood pressure is 126/69 and his pulse is 83. His respiration is 14 and oxygen saturation is 98%.     Chief Complaint: Sinus Problem    25 y/o male c/o with bodyaches sorethroat stuffy nose etc that started yesterday. Patient states he has diarrhea and fatigue. Patient states he took OTC meds.     Pt is a 25 yo M [employer]  who presents with sore throat, nasal congestion, body aches, diarrhea, and fatigue that started 1 day ago    Sinus Problem  This is a new problem. The current episode started yesterday. The problem has been gradually worsening since onset. There has been no fever. The pain is moderate. Associated symptoms include chills, congestion, coughing, headaches, sinus pressure, sneezing and a sore throat. Pertinent negatives include no diaphoresis, ear pain, hoarse voice, neck pain, shortness of breath or swollen glands. Past treatments include acetaminophen. The treatment provided no relief.       Constitution: Positive for chills. Negative for sweating.   HENT:  Positive for congestion, sinus pressure and sore throat. Negative for ear pain.    Neck: Negative for neck pain.   Respiratory:  Positive for cough. Negative for shortness of breath.    Allergic/Immunologic: Positive for sneezing.   Neurological:  Positive for headaches.      Objective:     Physical Exam   Constitutional: He is oriented to person, place, and time. He appears well-developed. He is cooperative.  Non-toxic appearance. He does not appear ill. No distress.   HENT:   Head: Normocephalic and atraumatic.   Ears:   Right Ear: Hearing, tympanic membrane, external ear and ear canal normal.   Left Ear: Hearing, tympanic membrane, external ear and ear canal normal.   Nose: Rhinorrhea present. No mucosal edema or nasal deformity. No epistaxis. Right " sinus exhibits no maxillary sinus tenderness and no frontal sinus tenderness. Left sinus exhibits no maxillary sinus tenderness and no frontal sinus tenderness.   Mouth/Throat: Uvula is midline and mucous membranes are normal. No trismus in the jaw. Normal dentition. No uvula swelling. Posterior oropharyngeal erythema present. No oropharyngeal exudate or posterior oropharyngeal edema.   Eyes: Conjunctivae and lids are normal. No scleral icterus.   Neck: Trachea normal and phonation normal. Neck supple. No edema present. No erythema present. No neck rigidity present.   Cardiovascular: Normal rate, regular rhythm, normal heart sounds and normal pulses.   Pulmonary/Chest: Effort normal and breath sounds normal. No respiratory distress. He has no decreased breath sounds. He has no rhonchi.   Abdominal: Normal appearance.   Musculoskeletal: Normal range of motion.         General: No deformity. Normal range of motion.   Neurological: He is alert and oriented to person, place, and time. He exhibits normal muscle tone. Coordination normal.   Skin: Skin is warm, dry, intact, not diaphoretic and not pale.   Psychiatric: His speech is normal and behavior is normal. Judgment and thought content normal.   Nursing note and vitals reviewed.    Results for orders placed or performed in visit on 03/11/25   POCT Influenza A/B Molecular    Collection Time: 03/11/25 12:05 PM   Result Value Ref Range    POC Molecular Influenza A Ag Positive (A) Negative    POC Molecular Influenza B Ag Negative Negative     Acceptable Yes        Assessment:     1. Cough, unspecified type    2. Influenza A        Plan:       Cough, unspecified type  -     POCT Influenza A/B Molecular    Influenza A  -     oseltamivir (TAMIFLU) 75 MG capsule; Take 1 capsule (75 mg total) by mouth 2 (two) times daily. for 5 days  Dispense: 10 capsule; Refill: 0  -     ipratropium (ATROVENT) 42 mcg (0.06 %) nasal spray; 2 sprays by Each Nostril route 4 (four)  times daily.  Dispense: 15 mL; Refill: 0        Patient Instructions   Rest  Fluids  Warm tea with honey and lemon  Warm salt water gargles  Warm sinus compresses  mucinex (guaifenesin)  Nasal saline  Tylenol or advil for pain or fever  IF no improvement or worsening, follow-up with PCP    You must be fever free for 24 hours without fever reducing medicines and symptom improvement to return to school    You must understand that you have received treatment at an Urgent Care facility only, and that you may be  released before all of your medical problems are known or treated. Urgent Care facilities are not equipped to  handle life threatening emergencies. It is recommended that you seek care at an Emergency Department for  further evaluation of worsening or concerning symptoms, or possibly life threatening conditions as  discussed.      If you develop chest pain, shortness of breath, throat swelling, tongue swelling, lightheadedness or any other causes for concern, proceed to ER.

## 2025-04-07 ENCOUNTER — OFFICE VISIT (OUTPATIENT)
Dept: SPORTS MEDICINE | Facility: CLINIC | Age: 25
End: 2025-04-07
Payer: COMMERCIAL

## 2025-04-07 ENCOUNTER — HOSPITAL ENCOUNTER (OUTPATIENT)
Dept: RADIOLOGY | Facility: HOSPITAL | Age: 25
Discharge: HOME | End: 2025-04-07
Attending: ORTHOPAEDIC SURGERY
Payer: COMMERCIAL

## 2025-04-07 VITALS
SYSTOLIC BLOOD PRESSURE: 127 MMHG | WEIGHT: 184.44 LBS | BODY MASS INDEX: 25.82 KG/M2 | HEART RATE: 56 BPM | DIASTOLIC BLOOD PRESSURE: 77 MMHG | HEIGHT: 71 IN

## 2025-04-07 DIAGNOSIS — M99.02 SOMATIC DYSFUNCTION OF THORACIC REGION: ICD-10-CM

## 2025-04-07 DIAGNOSIS — M99.01 SOMATIC DYSFUNCTION OF CERVICAL REGION: ICD-10-CM

## 2025-04-07 DIAGNOSIS — M99.08 SOMATIC DYSFUNCTION OF RIB CAGE REGION: ICD-10-CM

## 2025-04-07 DIAGNOSIS — M54.2 NECK PAIN: ICD-10-CM

## 2025-04-07 DIAGNOSIS — M99.00 CRANIAL SOMATIC DYSFUNCTION: ICD-10-CM

## 2025-04-07 DIAGNOSIS — M99.07 SOMATIC DYSFUNCTION OF UPPER EXTREMITY: ICD-10-CM

## 2025-04-07 DIAGNOSIS — M54.2 NECK PAIN: Primary | ICD-10-CM

## 2025-04-07 DIAGNOSIS — M54.12 CERVICAL NEURITIS: ICD-10-CM

## 2025-04-07 PROCEDURE — 3078F DIAST BP <80 MM HG: CPT | Mod: CPTII,S$GLB,, | Performed by: ORTHOPAEDIC SURGERY

## 2025-04-07 PROCEDURE — 1159F MED LIST DOCD IN RCRD: CPT | Mod: CPTII,S$GLB,, | Performed by: ORTHOPAEDIC SURGERY

## 2025-04-07 PROCEDURE — 72050 X-RAY EXAM NECK SPINE 4/5VWS: CPT | Mod: 26,,, | Performed by: RADIOLOGY

## 2025-04-07 PROCEDURE — 99214 OFFICE O/P EST MOD 30 MIN: CPT | Mod: 25,S$GLB,, | Performed by: ORTHOPAEDIC SURGERY

## 2025-04-07 PROCEDURE — 1160F RVW MEDS BY RX/DR IN RCRD: CPT | Mod: CPTII,S$GLB,, | Performed by: ORTHOPAEDIC SURGERY

## 2025-04-07 PROCEDURE — 99999 PR PBB SHADOW E&M-EST. PATIENT-LVL III: CPT | Mod: PBBFAC,,, | Performed by: ORTHOPAEDIC SURGERY

## 2025-04-07 PROCEDURE — 72050 X-RAY EXAM NECK SPINE 4/5VWS: CPT | Mod: TC

## 2025-04-07 PROCEDURE — 3008F BODY MASS INDEX DOCD: CPT | Mod: CPTII,S$GLB,, | Performed by: ORTHOPAEDIC SURGERY

## 2025-04-07 PROCEDURE — 3074F SYST BP LT 130 MM HG: CPT | Mod: CPTII,S$GLB,, | Performed by: ORTHOPAEDIC SURGERY

## 2025-04-07 PROCEDURE — 98927 OSTEOPATH MANJ 5-6 REGIONS: CPT | Mod: S$GLB,,, | Performed by: ORTHOPAEDIC SURGERY

## 2025-04-07 RX ORDER — METHYLPREDNISOLONE 4 MG/1
TABLET ORAL
Qty: 21 EACH | Refills: 0 | Status: SHIPPED | OUTPATIENT
Start: 2025-04-07

## 2025-04-07 NOTE — PROGRESS NOTES
"CC: left shoulder pain    24 y.o. Male presents today for evaluation of his left shoulder and neck pain. 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 He states he went to stop a ball and dove feeling a pinch in the left upper trapezius musculature radiating into his upper back. He states he was feeling improved and then played again this weekend and felt the same pinching, pulling pain. He also notes jaw numbness and numbness to the 3-5 digits that resolved the following day, but this has improved.     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 No pertinent past medical history.    PAST SURGICAL HISTORY:   History reviewed. No pertinent surgical history.    FAMILY HISTORY:   No family history on file.    SOCIAL HISTORY:   Social History[1]    MEDICATIONS:   Current Medications[2]    ALLERGIES:   Review of patient's allergies indicates:  No Known Allergies     PHYSICAL EXAMINATION:  /77   Pulse (!) 56   Ht 5' 11" (1.803 m)   Wt 83.7 kg (184 lb 6.6 oz)   BMI 25.72 kg/m²   Vitals signs and nursing note have been reviewed.  General: In no acute distress, well developed, well nourished, no diaphoresis  Eyes: EOM full and smooth, no eye redness or discharge  HENT: normocephalic and atraumatic, neck supple, trachea midline, no nasal discharge, no external ear redness or discharge  Cardiovascular: 2+ and symmetric radial bilaterally, no LE edema  Lungs: respirations non-labored, no conversational dyspnea   Abd: non-distended, no rigidity  MSK: no amputation or deformity, no swelling of " extremities  Neuro: AAOx3, CN2-12 grossly intact  Skin: No rashes, warm and dry  Psychiatric: cooperative, pleasant, mood and affect appropriate for age    SHOULDER: LEFT  The affected shoulder is compared to the contralateral shoulder.    Observation:    CERVICAL SPINE  Anterior head carriage. + mild thoracic kyphosis.  Full AROM in flexion, extension, sidebending, and rotation.    SHOULDER  No ecchymosis, edema, or erythema throughout the shoulder girdle.  No sternal, clavicular, or acromial deformities bilaterally.  No atrophy of the pectorals, deltoids, supraspinatus, infraspinatus, or biceps bilaterally.  No asymmetry of shoulders bilaterally.    ROM:  Active flexion to 180° on left and 180° on right.   Active abduction to 180° on left and 180° on right.    Active internal rotation to T7 on left and T7 on right.    Active external rotation to T4 on left and T4 on right.    No scapular dyskinesia or winging.    Tenderness:  No tenderness at the SC or AC joint  No tenderness over the clavicle   No tenderness over biceps tendon in the bicipital groove  No tenderness over subacromial space  + tenderness along the left cervical paraspinal and TPs  + tenderness over the left upper ribcage    Strength Testing: (*pain)  Deltoid - 5/5 on left and 5/5 on right  Biceps - 5/5 on left and 5/5 on right  Triceps - 5/5 on left and 5/5 on right  Wrist extension - 5/5 on left and 5/5 on right  Wrist flexion - 5/5 on left and 5/5 on right   - 5/5 on left and 5/5 on right  Finger extension - 5/5 on left and 5/5 on right  Finger abduction - 5/5 on left and 5/5 on right    Special Tests:  Empty can test - negative  Full can test - negative  Bear hug test - negative  Belly press test - negative  Resisted internal rotation - negative  Resisted external rotation - negative    Neer's test - negative  Hawkin's-Jose test - negative    OTeds test - negative    Biceps load 1 test - negative  Biceps load 2 test - negative  Hernández  sheer test - negative    Speed's test - negative  Yergason's test - negative    Sulcus sign - none  AP load and shift laxity - none  Anterior apprehension test - negative    Posture:  Upright, Increased thoracic kyphosis, and Anterior head carriage  Gait: Non-antalgic     TART (Tissue texture abnormality, Asymmetry,  Restriction of motion and/or Tenderness) changes:    Head: Occipitoatlantal (OA) Joint ES-left, R-right  Cranial Rhythmic Impulse (CRI): unrestricted with Normal amplitude    Suture/Bone Restriction Side   Occipitomastoid (OM)  Present L   Parietal mastoid (PM) Present L   Sphenosquamous pivot (SSP) Present L   Zygomaticotemporal (ZT) Present L   Zygomaticofrontal (ZF) Present L   Frontonasal Absent    [place] bone Absent    Parietal bone Present L   Frontal bone Present L        Cervical Spine  Thoracic Spine  Lumbar Spine   C1 Neutral T1 Neutral L1    C2 ERSL T2 NSRRL L2    C3 Neutral T3 NSRRL L3    C4 FRSL T4 NSRRL L4    C5 FRSL T5 NSRRL L5    C6 FRSL T6 Neutral     C7 Neutral T7 Neutral       T8 Neutral       T9 Neutral       T10 Neutral       T11 Neutral       T12 Neutral       Ribs:  Superior rib 1 left  Posterior rib 2 left    Upper Extremity:  Periscapular MFR left    Abdomen:    Pelvis:    Sacrum:    Lower Extremity:      Key   F= Flexed   E = Extended   R = Rotated   N = Neutral   S = Sidebent   TTA = tissue texture abnormality   L/R/B (last letter) = left/right/bilateral   HTP = fascial herniated trigger point   TB = fascial trigger band   CD = fascial continuum distortion   MFR = myofascial restriction     Neurovascular Exam:  2+ radial pulses BL  Sensation intact to light touch in the distal median, radial, and ulnar nerve distributions bilaterally.  Spurlings test - negative  Lhermittes test - negative  Negative Tinel's at carpal tunnel  Negative Tinel's at cubital tunnel  Capillary refill intact <2 seconds in all digits bilaterally      IMAGIN. X-ray ordered due to left upper  extremity radiculopathy   2. X-ray images were reviewed personally by me and then directly with patient.  3. FINDINGS: X-ray images obtained demonstrate that the disc spaces are well maintained. No significant encroachment of the neural foramina identified on either side. No fracture or dislocation. No bone destruction identified   4. IMPRESSION: As above.       ASSESSMENT:      ICD-10-CM ICD-9-CM   1. Neck pain  M54.2 723.1   2. Cervical neuritis  M54.12 723.4   3. Cranial somatic dysfunction  M99.00 739.0   4. Somatic dysfunction of thoracic region  M99.02 739.2   5. Somatic dysfunction of rib cage region  M99.08 739.8   6. Somatic dysfunction of cervical region  M99.01 739.1   7. Somatic dysfunction of upper extremity  M99.07 739.7         PLAN:  1-2. 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 He played again 1 week later and appreciated similar symptoms when divining for ball with the addition of numbness to his jaw and 3-5 digits that resolved the following day.     - XRs ordered in the office today and images were personally reviewed with the patient. See above for further detail.    - Symptoms, exam, and imaging are most consistent with acute nerve irritation secondary to stretch injury that is improving.  We discussed the importance of decreasing inflammation and strengthening and stabilizing to help promote and maintain symptom improvement/resolution.  This is commonly accomplished with a short course of an anti-inflammatory and icing in addition to osteopathic manipulation, a home exercise program or physical therapy.    - Based on his description of pain/body language and somatic dysfunction identified on exam, I discussed osteopathic manipulation as a treatment option today. He consents to evaluation and treatment. See below.    - Medrol  dosepack prescribed today.     - His  was present for the duration of the visit today and expressed understanding to the plan.       3-7. Somatic dysfunction of cervical, cranial, thoracic, upper extremity, ribcage regions -     - OMT 5-6 regions. Oral consent obtained. Reviewed benefits and potential side effects. OMT indicated today due to signs and symptoms as well as local and remote somatic dysfunction findings and their related neurokinetic, lymphatic, fascial and/or arteriovenous body connections. OMT techniques used: Soft Tissue, Myofascial Release, Muscle Energy, and Cranial . Treatment was tolerated well. Improvement noted in segmental mobility post-treatment in dysfunctional regions. There were no adverse events and no complications immediately following treatment. Advised plenty of water to help alleviate soreness.      Future planning includes - possibly more OMT if helpful and if indicated, MRI if no change in symptoms or if they worsen    All questions were answered to the best of my ability and all concerns were addressed at this time.    Follow up in athletic training room as needed.       This note is dictated using the M*Modal Fluency Direct word recognition program. There are word recognition mistakes that are occasionally missed on review.           [1]   Social History  Socioeconomic History    Marital status: Single   Tobacco Use    Smoking status: Never    Smokeless tobacco: Never   [2]   Current Outpatient Medications:     clobetasoL (TEMOVATE) 0.05 % cream, Apply topically 2 (two) times daily., Disp: 45 g, Rfl: 0    ipratropium (ATROVENT) 42 mcg (0.06 %) nasal spray, 2 sprays by Each Nostril route 4 (four) times daily., Disp: 15 mL, Rfl: 0    EPINEPHrine (EPIPEN) 0.3 mg/0.3 mL AtIn, Inject 0.3 mLs (0.3 mg total) into the muscle once. for 1 dose, Disp: 0.3 mL, Rfl: 0    methylPREDNISolone (MEDROL DOSEPACK) 4 mg tablet, use as directed, Disp: 21 each, Rfl: 0

## 2025-04-28 DIAGNOSIS — M54.2 NECK PAIN: Primary | ICD-10-CM

## 2025-04-29 RX ORDER — MELOXICAM 15 MG/1
15 TABLET ORAL DAILY
Qty: 30 TABLET | Refills: 0 | Status: SHIPPED | OUTPATIENT
Start: 2025-04-29